# Patient Record
Sex: MALE | Race: BLACK OR AFRICAN AMERICAN | NOT HISPANIC OR LATINO | Employment: UNEMPLOYED | URBAN - NONMETROPOLITAN AREA
[De-identification: names, ages, dates, MRNs, and addresses within clinical notes are randomized per-mention and may not be internally consistent; named-entity substitution may affect disease eponyms.]

---

## 2021-08-31 ENCOUNTER — HOSPITAL ENCOUNTER (EMERGENCY)
Facility: HOSPITAL | Age: 51
Discharge: HOME | End: 2021-08-31
Attending: STUDENT IN AN ORGANIZED HEALTH CARE EDUCATION/TRAINING PROGRAM
Payer: MEDICAID

## 2021-08-31 VITALS
TEMPERATURE: 102 F | RESPIRATION RATE: 18 BRPM | BODY MASS INDEX: 33.32 KG/M2 | OXYGEN SATURATION: 97 % | DIASTOLIC BLOOD PRESSURE: 91 MMHG | SYSTOLIC BLOOD PRESSURE: 163 MMHG | HEART RATE: 75 BPM | HEIGHT: 65 IN | WEIGHT: 200 LBS

## 2021-08-31 DIAGNOSIS — U07.1 COVID-19: Primary | ICD-10-CM

## 2021-08-31 PROCEDURE — 99283 EMERGENCY DEPT VISIT LOW MDM: CPT

## 2021-08-31 PROCEDURE — 25000003 PHARM REV CODE 250: Performed by: STUDENT IN AN ORGANIZED HEALTH CARE EDUCATION/TRAINING PROGRAM

## 2021-08-31 RX ORDER — AZITHROMYCIN 250 MG/1
500 TABLET, FILM COATED ORAL DAILY
Qty: 5 TABLET | Refills: 0 | Status: SHIPPED | OUTPATIENT
Start: 2021-08-31 | End: 2021-09-05

## 2021-08-31 RX ORDER — ACETAMINOPHEN 325 MG/1
650 TABLET ORAL
Status: COMPLETED | OUTPATIENT
Start: 2021-08-31 | End: 2021-08-31

## 2021-08-31 RX ADMIN — ACETAMINOPHEN 650 MG: 325 TABLET ORAL at 07:08

## 2021-09-04 ENCOUNTER — NURSE TRIAGE (OUTPATIENT)
Dept: ADMINISTRATIVE | Facility: CLINIC | Age: 51
End: 2021-09-04

## 2021-11-22 ENCOUNTER — HOSPITAL ENCOUNTER (EMERGENCY)
Facility: HOSPITAL | Age: 51
Discharge: HOME | End: 2021-11-22
Attending: EMERGENCY MEDICINE
Payer: MEDICAID

## 2021-11-22 VITALS
HEIGHT: 64 IN | RESPIRATION RATE: 16 BRPM | DIASTOLIC BLOOD PRESSURE: 83 MMHG | TEMPERATURE: 99 F | HEART RATE: 72 BPM | BODY MASS INDEX: 29.37 KG/M2 | SYSTOLIC BLOOD PRESSURE: 172 MMHG | WEIGHT: 172 LBS | OXYGEN SATURATION: 99 %

## 2021-11-22 DIAGNOSIS — H92.01 OTALGIA OF RIGHT EAR: Primary | ICD-10-CM

## 2021-11-22 PROCEDURE — 25000003 PHARM REV CODE 250: Performed by: EMERGENCY MEDICINE

## 2021-11-22 PROCEDURE — 99284 EMERGENCY DEPT VISIT MOD MDM: CPT | Mod: 25

## 2021-11-22 PROCEDURE — 63600175 PHARM REV CODE 636 W HCPCS: Performed by: EMERGENCY MEDICINE

## 2021-11-22 PROCEDURE — 96372 THER/PROPH/DIAG INJ SC/IM: CPT

## 2021-11-22 RX ORDER — AMOXICILLIN 875 MG/1
875 TABLET, FILM COATED ORAL 2 TIMES DAILY
Qty: 14 TABLET | Refills: 0 | Status: ON HOLD | OUTPATIENT
Start: 2021-11-22 | End: 2023-07-30 | Stop reason: HOSPADM

## 2021-11-22 RX ORDER — CEFTRIAXONE 1 G/1
1 INJECTION, POWDER, FOR SOLUTION INTRAMUSCULAR; INTRAVENOUS ONCE
Status: COMPLETED | OUTPATIENT
Start: 2021-11-22 | End: 2021-11-22

## 2021-11-22 RX ORDER — DEXAMETHASONE SODIUM PHOSPHATE 4 MG/ML
4 INJECTION, SOLUTION INTRA-ARTICULAR; INTRALESIONAL; INTRAMUSCULAR; INTRAVENOUS; SOFT TISSUE
Status: COMPLETED | OUTPATIENT
Start: 2021-11-22 | End: 2021-11-22

## 2021-11-22 RX ORDER — CIPROFLOXACIN 0.5 MG/.25ML
4 SOLUTION/ DROPS AURICULAR (OTIC) 2 TIMES DAILY
Qty: 20 EACH | Refills: 0 | Status: SHIPPED | OUTPATIENT
Start: 2021-11-22 | End: 2021-12-02

## 2021-11-22 RX ORDER — LIDOCAINE HYDROCHLORIDE 10 MG/ML
1 INJECTION INFILTRATION; PERINEURAL ONCE
Status: COMPLETED | OUTPATIENT
Start: 2021-11-22 | End: 2021-11-22

## 2021-11-22 RX ADMIN — LIDOCAINE HYDROCHLORIDE 1 ML: 10 INJECTION, SOLUTION INFILTRATION; PERINEURAL at 06:11

## 2021-11-22 RX ADMIN — CEFTRIAXONE SODIUM 1 G: 1 INJECTION, POWDER, FOR SOLUTION INTRAMUSCULAR; INTRAVENOUS at 06:11

## 2021-11-22 RX ADMIN — DEXAMETHASONE SODIUM PHOSPHATE 4 MG: 4 INJECTION, SOLUTION INTRA-ARTICULAR; INTRALESIONAL; INTRAMUSCULAR; INTRAVENOUS; SOFT TISSUE at 06:11

## 2021-12-06 ENCOUNTER — HOSPITAL ENCOUNTER (EMERGENCY)
Facility: HOSPITAL | Age: 51
Discharge: HOME | End: 2021-12-06
Attending: EMERGENCY MEDICINE
Payer: MEDICAID

## 2021-12-06 VITALS
WEIGHT: 177 LBS | OXYGEN SATURATION: 99 % | TEMPERATURE: 98 F | HEIGHT: 65 IN | BODY MASS INDEX: 29.49 KG/M2 | RESPIRATION RATE: 16 BRPM | SYSTOLIC BLOOD PRESSURE: 140 MMHG | DIASTOLIC BLOOD PRESSURE: 80 MMHG | HEART RATE: 68 BPM

## 2021-12-06 DIAGNOSIS — H60.91 OTITIS EXTERNA OF RIGHT EAR, UNSPECIFIED CHRONICITY, UNSPECIFIED TYPE: Primary | ICD-10-CM

## 2021-12-06 PROCEDURE — 96372 THER/PROPH/DIAG INJ SC/IM: CPT

## 2021-12-06 PROCEDURE — 63600175 PHARM REV CODE 636 W HCPCS: Performed by: NURSE PRACTITIONER

## 2021-12-06 PROCEDURE — 99284 EMERGENCY DEPT VISIT MOD MDM: CPT | Mod: 25

## 2021-12-06 PROCEDURE — 25000003 PHARM REV CODE 250: Performed by: NURSE PRACTITIONER

## 2021-12-06 RX ORDER — NEOMYCIN SULFATE, POLYMYXIN B SULFATE AND HYDROCORTISONE 10; 3.5; 1 MG/ML; MG/ML; [USP'U]/ML
4 SUSPENSION/ DROPS AURICULAR (OTIC) 3 TIMES DAILY
Qty: 8 ML | Refills: 0 | Status: SHIPPED | OUTPATIENT
Start: 2021-12-06 | End: 2021-12-16

## 2021-12-06 RX ORDER — LIDOCAINE HYDROCHLORIDE 10 MG/ML
1 INJECTION INFILTRATION; PERINEURAL
Status: COMPLETED | OUTPATIENT
Start: 2021-12-06 | End: 2021-12-06

## 2021-12-06 RX ORDER — CEFTRIAXONE 1 G/1
1 INJECTION, POWDER, FOR SOLUTION INTRAMUSCULAR; INTRAVENOUS
Status: COMPLETED | OUTPATIENT
Start: 2021-12-06 | End: 2021-12-06

## 2021-12-06 RX ADMIN — LIDOCAINE HYDROCHLORIDE 1 ML: 10 INJECTION, SOLUTION INFILTRATION; PERINEURAL at 10:12

## 2021-12-06 RX ADMIN — CEFTRIAXONE SODIUM 1 G: 1 INJECTION, POWDER, FOR SOLUTION INTRAMUSCULAR; INTRAVENOUS at 10:12

## 2021-12-10 ENCOUNTER — PATIENT OUTREACH (OUTPATIENT)
Dept: EMERGENCY MEDICINE | Facility: HOSPITAL | Age: 51
End: 2021-12-10
Payer: MEDICAID

## 2021-12-12 ENCOUNTER — HOSPITAL ENCOUNTER (EMERGENCY)
Facility: HOSPITAL | Age: 51
Discharge: HOME | End: 2021-12-12
Attending: STUDENT IN AN ORGANIZED HEALTH CARE EDUCATION/TRAINING PROGRAM
Payer: MEDICAID

## 2021-12-12 VITALS
HEART RATE: 60 BPM | BODY MASS INDEX: 28.82 KG/M2 | OXYGEN SATURATION: 100 % | TEMPERATURE: 99 F | HEIGHT: 65 IN | RESPIRATION RATE: 16 BRPM | SYSTOLIC BLOOD PRESSURE: 225 MMHG | DIASTOLIC BLOOD PRESSURE: 99 MMHG | WEIGHT: 173 LBS

## 2021-12-12 DIAGNOSIS — H66.91 RIGHT OTITIS MEDIA, UNSPECIFIED OTITIS MEDIA TYPE: Primary | ICD-10-CM

## 2021-12-12 DIAGNOSIS — I10 HYPERTENSION, UNSPECIFIED TYPE: ICD-10-CM

## 2021-12-12 PROCEDURE — 96372 THER/PROPH/DIAG INJ SC/IM: CPT

## 2021-12-12 PROCEDURE — 63600175 PHARM REV CODE 636 W HCPCS: Performed by: STUDENT IN AN ORGANIZED HEALTH CARE EDUCATION/TRAINING PROGRAM

## 2021-12-12 PROCEDURE — 99284 EMERGENCY DEPT VISIT MOD MDM: CPT | Mod: 25

## 2021-12-12 PROCEDURE — 25000003 PHARM REV CODE 250: Performed by: STUDENT IN AN ORGANIZED HEALTH CARE EDUCATION/TRAINING PROGRAM

## 2021-12-12 RX ORDER — KETOROLAC TROMETHAMINE 10 MG/1
10 TABLET, FILM COATED ORAL EVERY 6 HOURS
Qty: 20 TABLET | Refills: 0 | Status: SHIPPED | OUTPATIENT
Start: 2021-12-12 | End: 2021-12-17

## 2021-12-12 RX ORDER — AMOXICILLIN AND CLAVULANATE POTASSIUM 875; 125 MG/1; MG/1
1 TABLET, FILM COATED ORAL EVERY 12 HOURS
Qty: 20 TABLET | Refills: 0 | Status: SHIPPED | OUTPATIENT
Start: 2021-12-12 | End: 2021-12-12 | Stop reason: SDUPTHER

## 2021-12-12 RX ORDER — KETOROLAC TROMETHAMINE 30 MG/ML
30 INJECTION, SOLUTION INTRAMUSCULAR; INTRAVENOUS
Status: COMPLETED | OUTPATIENT
Start: 2021-12-12 | End: 2021-12-12

## 2021-12-12 RX ORDER — AMOXICILLIN AND CLAVULANATE POTASSIUM 875; 125 MG/1; MG/1
1 TABLET, FILM COATED ORAL
Status: COMPLETED | OUTPATIENT
Start: 2021-12-12 | End: 2021-12-12

## 2021-12-12 RX ORDER — AMOXICILLIN AND CLAVULANATE POTASSIUM 875; 125 MG/1; MG/1
1 TABLET, FILM COATED ORAL EVERY 12 HOURS
Qty: 20 TABLET | Refills: 0 | Status: SHIPPED | OUTPATIENT
Start: 2021-12-12 | End: 2021-12-22

## 2021-12-12 RX ORDER — KETOROLAC TROMETHAMINE 10 MG/1
10 TABLET, FILM COATED ORAL EVERY 6 HOURS
Qty: 20 TABLET | Refills: 0 | Status: SHIPPED | OUTPATIENT
Start: 2021-12-12 | End: 2021-12-12 | Stop reason: SDUPTHER

## 2021-12-12 RX ADMIN — KETOROLAC TROMETHAMINE 30 MG: 30 INJECTION, SOLUTION INTRAMUSCULAR; INTRAVENOUS at 06:12

## 2021-12-12 RX ADMIN — AMOXICILLIN AND CLAVULANATE POTASSIUM 1 TABLET: 875; 125 TABLET, FILM COATED ORAL at 06:12

## 2021-12-27 ENCOUNTER — HOSPITAL ENCOUNTER (EMERGENCY)
Facility: HOSPITAL | Age: 51
Discharge: HOME | End: 2021-12-27
Attending: STUDENT IN AN ORGANIZED HEALTH CARE EDUCATION/TRAINING PROGRAM
Payer: MEDICAID

## 2021-12-27 VITALS
RESPIRATION RATE: 19 BRPM | OXYGEN SATURATION: 99 % | DIASTOLIC BLOOD PRESSURE: 101 MMHG | SYSTOLIC BLOOD PRESSURE: 222 MMHG | TEMPERATURE: 99 F | HEART RATE: 62 BPM

## 2021-12-27 DIAGNOSIS — G89.29 CHRONIC EAR PAIN, UNSPECIFIED LATERALITY: Primary | ICD-10-CM

## 2021-12-27 DIAGNOSIS — I10 UNCONTROLLED HYPERTENSION: ICD-10-CM

## 2021-12-27 DIAGNOSIS — H92.09 CHRONIC EAR PAIN, UNSPECIFIED LATERALITY: Primary | ICD-10-CM

## 2021-12-27 PROCEDURE — 99284 EMERGENCY DEPT VISIT MOD MDM: CPT | Mod: 25

## 2021-12-27 PROCEDURE — 96372 THER/PROPH/DIAG INJ SC/IM: CPT

## 2021-12-27 PROCEDURE — 63600175 PHARM REV CODE 636 W HCPCS: Performed by: STUDENT IN AN ORGANIZED HEALTH CARE EDUCATION/TRAINING PROGRAM

## 2021-12-27 RX ORDER — KETOROLAC TROMETHAMINE 10 MG/1
10 TABLET, FILM COATED ORAL EVERY 6 HOURS
Qty: 20 TABLET | Refills: 0 | Status: SHIPPED | OUTPATIENT
Start: 2021-12-27 | End: 2022-01-01

## 2021-12-27 RX ORDER — KETOROLAC TROMETHAMINE 30 MG/ML
30 INJECTION, SOLUTION INTRAMUSCULAR; INTRAVENOUS
Status: COMPLETED | OUTPATIENT
Start: 2021-12-27 | End: 2021-12-27

## 2021-12-27 RX ADMIN — KETOROLAC TROMETHAMINE 30 MG: 30 INJECTION, SOLUTION INTRAMUSCULAR at 06:12

## 2021-12-27 NOTE — ED PROVIDER NOTES
Encounter Date: 12/27/2021       History   No chief complaint on file.    51-year-old male with history of uncontrolled hypertension and chronic right ear pain for the past few weeks presents with worsening right ear pain after running out of his ketorolac.  Patient said that he has follow-up with ENT this Thursday.  Patient says he is also taking his antibiotics.  Patient denies any other new symptoms.  Patient mentioned that he does not take blood pressure medication because he believes it should be on the higher side.  Patient denies any associated symptoms with blood pressure.        Review of patient's allergies indicates:  No Known Allergies  No past medical history on file.  No past surgical history on file.  No family history on file.  Social History     Tobacco Use    Smoking status: Never Smoker    Smokeless tobacco: Never Used     Review of Systems   Constitutional: Negative.    HENT: Positive for ear pain and facial swelling.    Respiratory: Negative.    Cardiovascular: Negative.    Gastrointestinal: Negative.    Genitourinary: Negative.    Musculoskeletal: Negative.    Skin: Negative.    Neurological: Negative.    Psychiatric/Behavioral: Negative.    All other systems reviewed and are negative.      Physical Exam     Initial Vitals [12/27/21 0611]   BP Pulse Resp Temp SpO2   (!) 219/102 62 19 98.7 °F (37.1 °C) 99 %      MAP       --         Physical Exam    Nursing note and vitals reviewed.  Constitutional: Vital signs are normal. He appears well-developed and well-nourished.   HENT:   Head: Normocephalic and atraumatic.   Mouth/Throat: No oropharyngeal exudate.   Serous fluid of right ear similar to prior no mastoid tenderness   Eyes: Conjunctivae and lids are normal.   Neck: Trachea normal. Neck supple.   Cardiovascular: Normal rate, regular rhythm and normal pulses.   Pulmonary/Chest: Breath sounds normal. He has no wheezes. He has no rhonchi.   Abdominal: Abdomen is soft. Bowel sounds are normal.    Musculoskeletal:         General: Normal range of motion.      Cervical back: Neck supple.     Neurological: He is alert and oriented to person, place, and time.   Skin: Skin is warm. Capillary refill takes less than 2 seconds.   Psychiatric: He has a normal mood and affect. His speech is normal.         ED Course   Procedures  Labs Reviewed - No data to display       Imaging Results    None          Medications   ketorolac injection 30 mg (30 mg Intramuscular Given 12/27/21 0629)     Medical Decision Making:   Initial Assessment:   51-year-old male with history of uncontrolled hypertension and chronic right ear pain for the past few weeks presents with worsening right ear pain after running out of his ketorolac. Patient hypertensive but otherwise stable vitals. Physical exam noted. Will give a dose of pain meds. Advised patient follow up with ent and pcp for chronic ear pain and also better bp control. Gave information on diet and exercise for bp control in the meantime                      Clinical Impression:   Final diagnoses:  [H92.09, G89.29] Chronic ear pain, unspecified laterality (Primary)  [I10] Uncontrolled hypertension          ED Disposition Condition    Discharge Stable        ED Prescriptions     Medication Sig Dispense Start Date End Date Auth. Provider    ketorolac (TORADOL) 10 mg tablet Take 1 tablet (10 mg total) by mouth every 6 (six) hours. for 5 days 20 tablet 12/27/2021 1/1/2022 Omi De La Torre MD        Follow-up Information     Follow up With Specialties Details Why Contact Info Additional Information    [facility] Emergency Department Emergency Medicine  As needed, If symptoms worsen [address] [facility] [id number] [phone] Floor 1    [facility] Psychology, Internal Medicine, Gynecology, Dental General Practice In 2 days  [address] [place] [address]  [phone]              Omi De La Torre MD  12/27/21 5621       Omi De La Torre  MD  12/27/21 0630       Omi De La Torre MD  12/27/21 0631

## 2021-12-27 NOTE — Clinical Note
"Rikki"Jodi Graham was seen and treated in our emergency department on 12/27/2021.  He may return to work on 12/29/2021.       If you have any questions or concerns, please don't hesitate to call.      Omi De La Torre MD"

## 2021-12-27 NOTE — ED TRIAGE NOTES
Pt states that he was here for right ear pain 3 weeks ago. He has an appointment on this Thursday but ran out of meds and wants pain meds.

## 2022-02-14 ENCOUNTER — HOSPITAL ENCOUNTER (EMERGENCY)
Facility: HOSPITAL | Age: 52
Discharge: HOME | End: 2022-02-14
Attending: EMERGENCY MEDICINE
Payer: MEDICAID

## 2022-02-14 VITALS
HEIGHT: 65 IN | SYSTOLIC BLOOD PRESSURE: 229 MMHG | WEIGHT: 176 LBS | BODY MASS INDEX: 29.32 KG/M2 | HEART RATE: 74 BPM | RESPIRATION RATE: 18 BRPM | OXYGEN SATURATION: 99 % | DIASTOLIC BLOOD PRESSURE: 98 MMHG | TEMPERATURE: 98 F

## 2022-02-14 DIAGNOSIS — R03.0 ELEVATED BLOOD PRESSURE READING: ICD-10-CM
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
## 2022-02-14 PROCEDURE — 63600175 PHARM REV CODE 636 W HCPCS: Performed by: EMERGENCY MEDICINE

## 2022-02-14 PROCEDURE — 96372 THER/PROPH/DIAG INJ SC/IM: CPT

## 2022-02-14 PROCEDURE — 99284 EMERGENCY DEPT VISIT MOD MDM: CPT | Mod: 25

## 2022-02-14 PROCEDURE — 25000003 PHARM REV CODE 250: Performed by: EMERGENCY MEDICINE

## 2022-02-14 RX ORDER — KETOROLAC TROMETHAMINE 30 MG/ML
60 INJECTION, SOLUTION INTRAMUSCULAR; INTRAVENOUS
Status: COMPLETED | OUTPATIENT
Start: 2022-02-14 | End: 2022-02-14

## 2022-02-14 RX ORDER — METHOCARBAMOL 500 MG/1
1000 TABLET, FILM COATED ORAL 3 TIMES DAILY
Qty: 30 TABLET | Refills: 0 | Status: SHIPPED | OUTPATIENT
Start: 2022-02-14 | End: 2022-02-19

## 2022-02-14 RX ORDER — CLONIDINE HYDROCHLORIDE 0.2 MG/1
0.2 TABLET ORAL
Status: DISCONTINUED | OUTPATIENT
Start: 2022-02-14 | End: 2022-02-14 | Stop reason: HOSPADM

## 2022-02-14 RX ADMIN — KETOROLAC TROMETHAMINE 60 MG: 30 INJECTION, SOLUTION INTRAMUSCULAR at 06:02
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
## 2022-05-23 ENCOUNTER — HOSPITAL ENCOUNTER (EMERGENCY)
Facility: HOSPITAL | Age: 52
Discharge: HOME | End: 2022-05-23
Attending: EMERGENCY MEDICINE
Payer: MEDICAID

## 2022-05-23 VITALS
TEMPERATURE: 98 F | BODY MASS INDEX: 28.79 KG/M2 | SYSTOLIC BLOOD PRESSURE: 217 MMHG | HEART RATE: 68 BPM | WEIGHT: 173 LBS | OXYGEN SATURATION: 98 % | DIASTOLIC BLOOD PRESSURE: 106 MMHG | RESPIRATION RATE: 18 BRPM

## 2022-05-23 DIAGNOSIS — R03.0 ELEVATED BLOOD PRESSURE READING IN OFFICE WITHOUT DIAGNOSIS OF HYPERTENSION: ICD-10-CM

## 2022-05-23 DIAGNOSIS — H10.31 ACUTE CONJUNCTIVITIS OF RIGHT EYE, UNSPECIFIED ACUTE CONJUNCTIVITIS TYPE: Primary | ICD-10-CM

## 2022-05-23 PROCEDURE — 25000003 PHARM REV CODE 250: Performed by: CLINICAL NURSE SPECIALIST

## 2022-05-23 PROCEDURE — 99283 EMERGENCY DEPT VISIT LOW MDM: CPT

## 2022-05-23 RX ORDER — TETRACAINE HYDROCHLORIDE 5 MG/ML
2 SOLUTION OPHTHALMIC ONCE
Status: COMPLETED | OUTPATIENT
Start: 2022-05-23 | End: 2022-05-23

## 2022-05-23 RX ORDER — ERYTHROMYCIN 5 MG/G
OINTMENT OPHTHALMIC
Qty: 3.5 G | Refills: 0 | Status: ON HOLD | OUTPATIENT
Start: 2022-05-23 | End: 2023-07-30 | Stop reason: HOSPADM

## 2022-05-23 RX ADMIN — TETRACAINE HYDROCHLORIDE 2 DROP: 5 SOLUTION OPHTHALMIC at 10:05

## 2022-05-23 NOTE — ED PROVIDER NOTES
Encounter Date: 5/23/2022       History     Chief Complaint   Patient presents with    Eye Problem     Woke up this morning with itching and burning in right eye. Watering. Onset this morning. Denies injury.      Rikki Graham is an 52 y.o. male who complains of right eye pain, itching, burning since this morning.  Denies any foreign body, injury.  /106, no history of hypertension.        Review of patient's allergies indicates:  No Known Allergies  History reviewed. No pertinent past medical history.  No past surgical history on file.  No family history on file.  Social History     Tobacco Use    Smoking status: Never Smoker    Smokeless tobacco: Never Used     Review of Systems   Constitutional: Negative for fever.   HENT: Negative for sore throat.    Respiratory: Negative for shortness of breath.    Cardiovascular: Negative for chest pain.   Gastrointestinal: Negative for nausea.   Genitourinary: Negative for dysuria.   Musculoskeletal: Negative for back pain.   Skin: Negative for rash.   Neurological: Negative for weakness.   Hematological: Does not bruise/bleed easily.   All other systems reviewed and are negative.      Physical Exam     Initial Vitals   BP Pulse Resp Temp SpO2   05/23/22 1016 05/23/22 1014 05/23/22 1014 05/23/22 1015 05/23/22 1014   (!) 217/106 68 18 98.1 °F (36.7 °C) 98 %      MAP       --                Physical Exam    Nursing note and vitals reviewed.  Constitutional: He appears well-developed and well-nourished.   HENT:   Head: Normocephalic and atraumatic.   Eyes: Pupils are equal, round, and reactive to light. Right eye exhibits discharge. Right conjunctiva is injected.   Cardiovascular: Normal rate and regular rhythm.   Pulmonary/Chest: Breath sounds normal.   Abdominal: Abdomen is soft. Bowel sounds are normal.   Musculoskeletal:         General: Normal range of motion.     Neurological: He is alert and oriented to person, place, and time.   Psychiatric: He has a normal  "mood and affect.         ED Course   Procedures  Labs Reviewed - No data to display       Imaging Results    None          Medications   TETRAcaine HCl (PF) 0.5 % Drop 2 drop (has no administration in time range)     Medical Decision Making:   Differential Diagnosis:   Conjunctivitis, foreign body to right eye, corneal abrasion  ED Management:  /106, patient refused to take blood pressure medication as ordered.  Denies any symptoms.  Patient states "has studied [employer] and black people are supposed to have elevated blood pressures.  He states he takes some vinegar at home in the blood pressure usually goes down.  Patient also reports that he does not have a blood pressure machine at home.  Instructed on this risk of strokes, dialysis with high blood pressures.  Patient will sign AMA form                      Clinical Impression:   Final diagnoses:  [H10.31] Acute conjunctivitis of right eye, unspecified acute conjunctivitis type (Primary)  [R03.0] Elevated blood pressure reading in office without diagnosis of hypertension          ED Disposition Condition    [place]               Ade Dykes NP  05/23/22 1027    "

## 2022-05-23 NOTE — LETTER
Patient: Rikki Graham  YOB: 1970  Date: 5/23/2022 Time: 10:27 AM  Location: [facility] Emergency Department    Leaving the [facility] Against Medical Advice    Chart #:[id number]    This will certify that I, the undersigned,    ______________________________________________________________________    A patient in the above named [facility] center, having requested discharge and removal from the medical [place] against the advice of my attending physician(s), hereby release [facility], its physicians, officers and employees, severally and individually, from any and all liability of any nature whatsoever for any injury or harm or complication of any kind that may result directly or indirectly, by reason of my terminating my stay as a patient at [facility] Department and my departure from [facility], and hereby waive any and all rights of action I may now have or later acquire as a result of my voluntary departure from [facility] and the termination of my stay as a patient therein.    This release is made with the full knowledge of the danger that may result from the action which I am taking.      Date:_______________________                         ___________________________                                                                                    Patient/Legal Representative    Witness:        ____________________________                          ___________________________  Nurse                                                                        Physician

## 2022-11-21 ENCOUNTER — HOSPITAL ENCOUNTER (EMERGENCY)
Facility: HOSPITAL | Age: 52
Discharge: HOME | End: 2022-11-21
Attending: EMERGENCY MEDICINE
Payer: MEDICAID

## 2022-11-21 VITALS
HEART RATE: 64 BPM | RESPIRATION RATE: 18 BRPM | WEIGHT: 177 LBS | OXYGEN SATURATION: 100 % | TEMPERATURE: 98 F | SYSTOLIC BLOOD PRESSURE: 214 MMHG | DIASTOLIC BLOOD PRESSURE: 105 MMHG | BODY MASS INDEX: 29.45 KG/M2

## 2022-11-21 DIAGNOSIS — I10 HYPERTENSION, UNSPECIFIED TYPE: ICD-10-CM

## 2022-11-21 DIAGNOSIS — G57.93 NEUROPATHY OF BOTH FEET: Primary | ICD-10-CM

## 2022-11-21 PROCEDURE — 96372 THER/PROPH/DIAG INJ SC/IM: CPT | Performed by: EMERGENCY MEDICINE

## 2022-11-21 PROCEDURE — 63600175 PHARM REV CODE 636 W HCPCS: Performed by: EMERGENCY MEDICINE

## 2022-11-21 PROCEDURE — 99284 EMERGENCY DEPT VISIT MOD MDM: CPT | Mod: 25

## 2022-11-21 RX ORDER — KETOROLAC TROMETHAMINE 30 MG/ML
15 INJECTION, SOLUTION INTRAMUSCULAR; INTRAVENOUS
Status: COMPLETED | OUTPATIENT
Start: 2022-11-21 | End: 2022-11-21

## 2022-11-21 RX ADMIN — KETOROLAC TROMETHAMINE 15 MG: 30 INJECTION, SOLUTION INTRAMUSCULAR at 07:11

## 2022-11-21 NOTE — ED PROVIDER NOTES
EMERGENCY DEPARTMENT HISTORY AND PHYSICAL EXAM     This note is dictated on M*Modal word recognition program.  There are word recognition mistakes and grammatical errors that are occasionally missed on review.     Date: 11/21/2022   Patient Name: Rikki Graham       History of Presenting Illness           Chief Complaint   Patient presents with    Foot Problem     Pt presents to the ER w/ complaints of bilateral foot pain for an unspecified amount of time. Pt states he suspects he has a fungal infection on both feet, has been attempting to treat with medicated ointment but reports worsening symptoms. Pt states he is concerned w/ possible infection.          History Provided By: Patient    0700   Rikki Graham is a 52 y.o. male with PMHX of hypertension who presents to the emergency department C/O bilateral foot pain.    Patient reports bilateral foot pain that feels like a numbness and burning in his feet.  States this has been going on for several weeks but he has been putting it off.  He states he thinks he might have a foot infection.  He has been using topical antifungals without relief.  No trauma.      PCP: Primary Doctor No        No current facility-administered medications for this encounter.     Current Outpatient Medications   Medication Sig Dispense Refill    amoxicillin (AMOXIL) 875 MG tablet Take 1 tablet (875 mg total) by mouth 2 (two) times daily. 14 tablet 0    erythromycin (ROMYCIN) ophthalmic ointment Place a 1/2 inch ribbon of ointment into the lower eyelid three times a day 3.5 g 0           Past History     Past Medical History:   History reviewed. No pertinent past medical history.     Past Surgical History:   No past surgical history on file.     Family History:   No family history on file.     Social History:   Social History     Tobacco Use    Smoking status: Never    Smokeless tobacco: Never        Allergies:   Review of patient's allergies indicates:  No Known  Allergies       Review of Systems   Review of Systems   Constitutional:  Negative for fever.   Musculoskeletal:  Positive for arthralgias and myalgias.   Skin:  Negative for rash and wound.   All other systems reviewed and are negative.             Physical Exam     Vitals:    11/21/22 0644 11/21/22 0647   BP:  (!) 214/105   Pulse:  64   Resp:  18   Temp:  97.9 °F (36.6 °C)   SpO2:  100%   Weight: 80.3 kg (177 lb)       Physical Exam  Vitals and nursing note reviewed.   Constitutional:       General: He is not in acute distress.     Appearance: Normal appearance. He is well-developed. He is not ill-appearing.   HENT:      Head: Normocephalic and atraumatic.   Eyes:      Extraocular Movements: Extraocular movements intact.      Conjunctiva/sclera: Conjunctivae normal.   Pulmonary:      Effort: Pulmonary effort is normal. No respiratory distress.   Musculoskeletal:         General: No deformity or signs of injury. Normal range of motion.      Cervical back: Normal range of motion. No rigidity.      Right foot: Normal range of motion.      Left foot: Normal range of motion.   Feet:      Right foot:      Skin integrity: Callus and dry skin present. No ulcer, blister, skin breakdown, erythema, warmth or fissure.      Toenail Condition: Right toenails are normal.      Left foot:      Skin integrity: Callus and dry skin present. No ulcer, blister, skin breakdown, erythema, warmth or fissure.      Toenail Condition: Left toenails are normal.   Skin:     General: Skin is dry.      Coloration: Skin is not pale.      Findings: No rash.   Neurological:      General: No focal deficit present.      Mental Status: He is alert and oriented to person, place, and time.      Cranial Nerves: No cranial nerve deficit.      Motor: No weakness.      Coordination: Coordination normal.   Psychiatric:         Mood and Affect: Mood normal.         Behavior: Behavior normal.            Diagnostic Study Results      Labs -   No results found  for this or any previous visit (from the past 12 hour(s)).     Radiologic Studies -    No orders to display        Medications given in the ED-   Medications   ketorolac injection 15 mg (15 mg Intramuscular Given 11/21/22 0707)           Medical Decision Making    I am the first provider for this patient.     I reviewed the vital signs, available nursing notes, past medical history, past surgical history, family history and social history.     Vital Signs:  Reviewed the patient's vital signs.     Pulse Oximetry Analysis and Interpretation:    100% on Room Air, normal      Records Reviewed: Nursing notes.        Provider Notes (Medical Decision Making): Rikki Graham is a 52 y.o. male with bilateral peripheral neuropathy no indications of bacterial or fungal infection.      Discuss this diagnosis with the patient.  Patient declining testing for diabetes at this time.  He denies history of diabetes.  Additionally discussed patient's elevated blood pressure.  He is declining treatment for his blood pressure at this time.  Review prior visits for patient has had significantly elevated blood pressure turn all prior ED visits.  H    Strongly encouraged patient to establish care primary care which he seemed receptive to.  Will provide referral to primary care office for patient.  Discussed he needs diabetes, blood pressure management, and if neuropathy persists there are long-term therapeutic options for improvement of his symptoms.  Patient seemed receptive to this.      Procedures:   Procedures      ED Course:               Diagnosis and Disposition     Critical Care:      DISCHARGE NOTE:       Rikki Graham's  results have been reviewed with him.  He has been counseled regarding his diagnosis, treatment, and plan.  He verbally conveys understanding and agreement of the signs, symptoms, diagnosis, treatment and prognosis and additionally agrees to follow up as discussed.  He also agrees with the care-plan  and conveys that all of his questions have been answered.  I have also provided discharge instructions for him that include: educational information regarding their diagnosis and treatment, and list of reasons why they would want to return to the ED prior to their follow-up appointment, should his condition change. He has been provided with education for proper emergency department utilization.         CLINICAL IMPRESSION:         1. Neuropathy of both feet    2. Hypertension, unspecified type              PLAN:   1. Discharge Home  2.      Medication List        ASK your doctor about these medications      amoxicillin 875 MG tablet  Commonly known as: AMOXIL  Take 1 tablet (875 mg total) by mouth 2 (two) times daily.     erythromycin ophthalmic ointment  Commonly known as: ROMYCIN  Place a 1/2 inch ribbon of ointment into the lower eyelid three times a day             3. Davidson Fonseca DO  [address] [address]  [phone]    Call today  Primary care follow up       _______________________________     Please note that this dictation was completed with [employer], the computer voice recognition software.  Quite often unanticipated grammatical, syntax, homophones, and other interpretive errors are inadvertently transcribed by the computer software.  Please disregard these errors.  Please excuse any errors that have escaped final proofreading.             Thad Franco MD  11/21/22 3396

## 2023-03-31 ENCOUNTER — HOSPITAL ENCOUNTER (EMERGENCY)
Facility: HOSPITAL | Age: 53
Discharge: HOME | End: 2023-03-31
Attending: EMERGENCY MEDICINE
Payer: MEDICAID

## 2023-03-31 VITALS
BODY MASS INDEX: 30.09 KG/M2 | HEART RATE: 69 BPM | TEMPERATURE: 98 F | OXYGEN SATURATION: 100 % | SYSTOLIC BLOOD PRESSURE: 201 MMHG | HEIGHT: 65 IN | RESPIRATION RATE: 16 BRPM | DIASTOLIC BLOOD PRESSURE: 94 MMHG | WEIGHT: 180.63 LBS

## 2023-03-31 DIAGNOSIS — I96 NECROSIS OF TOE: ICD-10-CM

## 2023-03-31 DIAGNOSIS — M79.675 TOE PAIN, LEFT: Primary | ICD-10-CM

## 2023-03-31 DIAGNOSIS — R52 PAIN: ICD-10-CM

## 2023-03-31 PROCEDURE — 25000003 PHARM REV CODE 250: Performed by: EMERGENCY MEDICINE

## 2023-03-31 PROCEDURE — 99284 EMERGENCY DEPT VISIT MOD MDM: CPT

## 2023-03-31 RX ORDER — CLONIDINE HYDROCHLORIDE 0.1 MG/1
0.1 TABLET ORAL
Status: COMPLETED | OUTPATIENT
Start: 2023-03-31 | End: 2023-03-31

## 2023-03-31 RX ORDER — KETOROLAC TROMETHAMINE 10 MG/1
10 TABLET, FILM COATED ORAL EVERY 8 HOURS PRN
Qty: 15 TABLET | Refills: 0 | Status: SHIPPED | OUTPATIENT
Start: 2023-03-31 | End: 2023-04-05

## 2023-03-31 RX ORDER — AMLODIPINE BESYLATE 10 MG/1
10 TABLET ORAL
Status: COMPLETED | OUTPATIENT
Start: 2023-03-31 | End: 2023-03-31

## 2023-03-31 RX ORDER — CEPHALEXIN 500 MG/1
500 CAPSULE ORAL 4 TIMES DAILY
Qty: 40 CAPSULE | Refills: 0 | Status: SHIPPED | OUTPATIENT
Start: 2023-03-31 | End: 2023-04-10

## 2023-03-31 RX ADMIN — CLONIDINE HYDROCHLORIDE 0.1 MG: 0.1 TABLET ORAL at 06:03

## 2023-03-31 RX ADMIN — AMLODIPINE BESYLATE 10 MG: 10 TABLET ORAL at 06:03

## 2023-03-31 NOTE — ED PROVIDER NOTES
"Encounter Date: 3/31/2023       History     Chief Complaint   Patient presents with    Toe Injury     Pt presents to the ER w/ complaints of L second to pain. Pt states he "dropped a frozen pork chop" on his toe 4 months ago, has seen his pcp but complains of increased pain. Pt also states the "toe is turning black."      53-year-old male presents the emergency room with left 2nd toe pain.  States he dropped a roast on it for months ago, was seen by his PCP, told to come here to get some antibiotics.  Has an appointment with a general surgeon  in 5 days.  Denies fever.  Complaining of pain with ambulation.  Better with rest and elevation.  No other issues.  No fever.  Patient states the tip of his toe has been black for quite some time now.    Review of patient's allergies indicates:  No Known Allergies  Past Medical History:   Diagnosis Date    Diabetes mellitus     Hypertension     Neuropathy      No past surgical history on file.  No family history on file.  Social History     Tobacco Use    Smoking status: Never    Smokeless tobacco: Never     Review of Systems   Constitutional:  Negative for fever.   HENT:  Negative for sore throat.    Respiratory:  Negative for shortness of breath.    Cardiovascular:  Negative for chest pain.   Gastrointestinal:  Negative for nausea.   Genitourinary:  Negative for dysuria.   Musculoskeletal:  Negative for back pain.   Skin:  Negative for rash.   Neurological:  Negative for weakness.   Hematological:  Does not bruise/bleed easily.   All other systems reviewed and are negative.    Physical Exam     Initial Vitals [03/31/23 0612]   BP Pulse Resp Temp SpO2   (!) 223/111 70 16 98.3 °F (36.8 °C) 100 %      MAP       --         Physical Exam    Nursing note and vitals reviewed.  Constitutional: He appears well-developed and well-nourished. He is not diaphoretic. No distress.   HENT:   Head: Normocephalic and atraumatic.   Eyes: Conjunctivae and EOM are normal. Pupils are " equal, round, and reactive to light. Right eye exhibits no discharge. Left eye exhibits no discharge. No scleral icterus.   Neck: Neck supple. No JVD present.   Normal range of motion.  Cardiovascular:  Normal rate, regular rhythm, normal heart sounds and intact distal pulses.           No murmur heard.  Pulmonary/Chest: Breath sounds normal. No stridor. No respiratory distress. He has no wheezes. He has no rhonchi. He has no rales. He exhibits no tenderness.   Abdominal: Abdomen is soft. Bowel sounds are normal. He exhibits no distension and no mass. There is no abdominal tenderness. There is no rebound and no guarding.   Musculoskeletal:         General: Tenderness present. No edema. Normal range of motion.      Cervical back: Normal range of motion and neck supple.      Comments: Left 2nd toe with distal necrosis, foot is warm, neurovascularly intact, steady gait     Neurological: He is alert and oriented to person, place, and time. He has normal strength. GCS score is 15. GCS eye subscore is 4. GCS verbal subscore is 5. GCS motor subscore is 6.   Skin: Skin is warm and dry. Capillary refill takes less than 2 seconds.       ED Course   Procedures  Labs Reviewed - No data to display       Imaging Results              X-Ray Foot Complete Left (In process)                      Medications   cloNIDine tablet 0.1 mg (0.1 mg Oral Given 3/31/23 0629)   amLODIPine tablet 10 mg (10 mg Oral Given 3/31/23 0629)     Medical Decision Making:   Differential Diagnosis:   Left 2nd toe distal necrosis, dry gangrene           ED Course as of 03/31/23 0640   Fri Mar 31, 2023   0635 X-ray with no bony involvement. [SD]      ED Course User Index  [SD] Avery Leonard MD                 Clinical Impression:   Final diagnoses:  [R52] Pain  [M79.675] Toe pain, left (Primary)  [I96] Necrosis of toe        ED Disposition Condition    Discharge Stable          ED Prescriptions       Medication Sig Dispense Start Date End Date Auth.  Provider    cephALEXin (KEFLEX) 500 MG capsule Take 1 capsule (500 mg total) by mouth 4 (four) times daily. for 10 days 40 capsule 3/31/2023 4/10/2023 Avery Leonard MD    ketorolac (TORADOL) 10 mg tablet Take 1 tablet (10 mg total) by mouth every 8 (eight) hours as needed for Pain. 15 tablet 3/31/2023 4/5/2023 Avery Leonard MD          Follow-up Information       Follow up With Specialties Details Why Contact Info Additional Information    Belinda Welch MD General Surgery On 4/5/2023  [facility] [address]  [phone]       [facility] Emergency Department Emergency Medicine  As needed, If symptoms worsen [address] [place] [address] [id number] [phone] Floor [address]             Avery Leonard MD  03/31/23 0607

## 2023-04-05 ENCOUNTER — OFFICE VISIT (OUTPATIENT)
Dept: WOUND CARE | Facility: HOSPITAL | Age: 53
End: 2023-04-05
Attending: SURGERY
Payer: MEDICAID

## 2023-04-05 VITALS
SYSTOLIC BLOOD PRESSURE: 179 MMHG | HEART RATE: 71 BPM | TEMPERATURE: 98 F | RESPIRATION RATE: 17 BRPM | DIASTOLIC BLOOD PRESSURE: 99 MMHG

## 2023-04-05 DIAGNOSIS — E11.621 TYPE 2 DIABETES MELLITUS WITH FOOT ULCER, UNSPECIFIED WHETHER LONG TERM INSULIN USE: ICD-10-CM

## 2023-04-05 DIAGNOSIS — L97.509 TYPE 2 DIABETES MELLITUS WITH FOOT ULCER, UNSPECIFIED WHETHER LONG TERM INSULIN USE: ICD-10-CM

## 2023-04-05 DIAGNOSIS — L97.522 ULCER OF LEFT FOOT, WITH FAT LAYER EXPOSED: ICD-10-CM

## 2023-04-05 DIAGNOSIS — R09.89 DECREASED PULSES IN FEET: Primary | ICD-10-CM

## 2023-04-05 PROCEDURE — 99214 OFFICE O/P EST MOD 30 MIN: CPT

## 2023-04-05 RX ORDER — GABAPENTIN 100 MG/1
100 CAPSULE ORAL 3 TIMES DAILY
Qty: 90 CAPSULE | Refills: 11 | Status: SHIPPED | OUTPATIENT
Start: 2023-04-05 | End: 2024-04-04

## 2023-05-19 PROBLEM — R09.89 DECREASED PULSES IN FEET: Status: ACTIVE | Noted: 2023-05-19

## 2023-05-19 PROBLEM — L97.509 TYPE 2 DIABETES MELLITUS WITH FOOT ULCER: Status: ACTIVE | Noted: 2023-05-19

## 2023-05-19 PROBLEM — E11.621 TYPE 2 DIABETES MELLITUS WITH FOOT ULCER: Status: ACTIVE | Noted: 2023-05-19

## 2023-05-19 PROBLEM — L97.522 ULCER OF LEFT FOOT, WITH FAT LAYER EXPOSED: Status: ACTIVE | Noted: 2023-05-19

## 2023-05-20 NOTE — PROGRESS NOTES
[facility]  Wound Care  History and Physical    Problem List Items Addressed This Visit          Cardiac/Vascular    Decreased pulses in feet - Primary    Overview     Pt with nonpalpable pulses BLE.  DEVYN on L 0.56 and R noncompressible.  No vascular workup in past.  Arterial US ordered.           Relevant Orders    CBC Auto Differential    Comprehensive Metabolic Panel    Sedimentation rate    C-REACTIVE PROTEIN    US Lower Extremity Arteries Bilateral    HEMOGLOBIN A1C       Endocrine    Type 2 diabetes mellitus with foot ulcer    Overview     Pt with longstanding diabetes.  Unsure of control.  Does not check blood sugars.  Last HgbA1c was 10.6.  Needs repeat labs.  Ordered today              Orthopedic    Ulcer of left foot, with fat layer exposed    Overview     Ischemic changes to L second toe.  He reports this has been present for 4 months since he dropped a frozen pork chop on his foot.  No previous wound care.  Eschar measures 1x1.5x0.1 at distal 2nd toe.  No erythema or drainage.  Painted with betadine.  Dry dressing applied.  Complains of pain and that is what prompted him to get care.  Neurontin written for pain.                  History:    Past Medical History:   Diagnosis Date    Diabetes mellitus     Hypertension     Neuropathy        History reviewed. No pertinent surgical history.    History reviewed. No pertinent family history.     reports that he has never smoked. He has never used smokeless tobacco. No history on file for alcohol use and drug use.    has a current medication list which includes the following prescription(s): amoxicillin, erythromycin, and gabapentin.    Allergies:  Patient has no known allergies.    Review of Systems:  Review of Systems   Constitutional:  Negative for chills, fever and malaise/fatigue.   HENT:  Negative for congestion, hearing loss and sore throat.    Respiratory:  Negative for cough.    Cardiovascular:  Positive for claudication. Negative  for chest pain, palpitations and leg swelling.   Gastrointestinal:  Negative for abdominal pain, nausea and vomiting.   Genitourinary:  Negative for dysuria and frequency.   Musculoskeletal:  Positive for joint pain and myalgias.   Skin:  Negative for rash.       Vitals:    04/05/23 1023   BP: (!) 179/99   Pulse: 71   Resp: 17   Temp: 97.8 °F (36.6 °C)         BMI:  There is no height or weight on file to calculate BMI.    Physical Exam:  Physical Exam  Vitals and nursing note reviewed.   Constitutional:       General: He is not in acute distress.     Appearance: Normal appearance.   HENT:      Head: Normocephalic and atraumatic.      Nose: Nose normal.      Mouth/Throat:      Mouth: Mucous membranes are moist.      Pharynx: Oropharynx is clear.   Eyes:      General: No scleral icterus.     Extraocular Movements: Extraocular movements intact.      Conjunctiva/sclera: Conjunctivae normal.      Pupils: Pupils are equal, round, and reactive to light.   Pulmonary:      Effort: No respiratory distress.      Breath sounds: Normal breath sounds. No wheezing or rales.   Abdominal:      General: There is no distension.      Palpations: Abdomen is soft.      Tenderness: There is no abdominal tenderness. There is no guarding or rebound.   Musculoskeletal:         General: Deformity present. No swelling. Normal range of motion.   Skin:     Comments: See wound docs   Neurological:      Mental Status: He is alert.      Sensory: Sensory deficit (decreased sensation BLE) present.      Motor: No weakness.   Psychiatric:         Mood and Affect: Mood normal.         Behavior: Behavior normal.       A1C:  No results for input(s): HGBA1C in the last 2160 hours.  BMP:  No results for input(s): GLU, NA, K, CL, CO2, BUN, CREATININE, CALCIUM, MG in the last 2160 hours.   CBC:  No results for input(s): WBC, RBC, HGB, HCT, PLT, MCV, MCH, MCHC in the last 2160 hours.  CMP:  No results for input(s): GLU, CALCIUM, ALBUMIN, PROT, NA, K, CO2, CL,  BUN, ALKPHOS, ALT, AST, BILITOT in the last 2160 hours.    Invalid input(s): CREATININ  PREALBUMIN:  No results for input(s): PREALBUMIN in the last 2160 hours.  WOUND CULTURES:  No results for input(s): LABAERO in the last 2160 hours.        Plan:  See Wound Docs note for plan and follow up.        Belinda Welch  [facility]

## 2023-07-29 ENCOUNTER — HOSPITAL ENCOUNTER (INPATIENT)
Facility: HOSPITAL | Age: 53
LOS: 1 days | DRG: 638 | End: 2023-07-30
Attending: EMERGENCY MEDICINE | Admitting: HOSPITALIST
Payer: MEDICAID

## 2023-07-29 DIAGNOSIS — I73.9 PERIPHERAL ARTERY DISEASE: ICD-10-CM

## 2023-07-29 DIAGNOSIS — M86.172 ACUTE OSTEOMYELITIS OF TOE OF LEFT FOOT: ICD-10-CM

## 2023-07-29 DIAGNOSIS — I16.0 ASYMPTOMATIC HYPERTENSIVE URGENCY: ICD-10-CM

## 2023-07-29 DIAGNOSIS — R07.9 CHEST PAIN: ICD-10-CM

## 2023-07-29 DIAGNOSIS — M86.10 ACUTE OSTEOMYELITIS: Primary | ICD-10-CM

## 2023-07-29 DIAGNOSIS — M79.672 LEFT FOOT PAIN: ICD-10-CM

## 2023-07-29 PROBLEM — I10 BENIGN ESSENTIAL HYPERTENSION: Status: ACTIVE | Noted: 2023-07-29

## 2023-07-29 PROBLEM — E11.628 TYPE 2 DIABETES MELLITUS WITH SKIN COMPLICATION, WITHOUT LONG-TERM CURRENT USE OF INSULIN: Status: ACTIVE | Noted: 2023-05-19

## 2023-07-29 PROBLEM — I96 DRY GANGRENE: Status: ACTIVE | Noted: 2023-07-29

## 2023-07-29 PROBLEM — E78.5 HYPERLIPIDEMIA: Status: ACTIVE | Noted: 2023-07-29

## 2023-07-29 LAB
ALBUMIN SERPL BCP-MCNC: 3.3 G/DL (ref 3.5–5.2)
ALP SERPL-CCNC: 59 U/L (ref 55–135)
ALT SERPL W/O P-5'-P-CCNC: 13 U/L (ref 10–44)
ANION GAP SERPL CALC-SCNC: 10 MMOL/L (ref 8–16)
AST SERPL-CCNC: 16 U/L (ref 10–40)
BASOPHILS # BLD AUTO: 0.05 K/UL (ref 0–0.2)
BASOPHILS NFR BLD: 0.8 % (ref 0–1.9)
BILIRUB SERPL-MCNC: 0.3 MG/DL (ref 0.1–1)
BUN SERPL-MCNC: 23 MG/DL (ref 6–20)
CALCIUM SERPL-MCNC: 9.8 MG/DL (ref 8.7–10.5)
CHLORIDE SERPL-SCNC: 106 MMOL/L (ref 95–110)
CO2 SERPL-SCNC: 22 MMOL/L (ref 23–29)
CREAT SERPL-MCNC: 1.3 MG/DL (ref 0.5–1.4)
CRP SERPL-MCNC: 3.4 MG/L (ref 0–8.2)
DIFFERENTIAL METHOD: ABNORMAL
EOSINOPHIL # BLD AUTO: 0.4 K/UL (ref 0–0.5)
EOSINOPHIL NFR BLD: 6.1 % (ref 0–8)
ERYTHROCYTE [DISTWIDTH] IN BLOOD BY AUTOMATED COUNT: 13.6 % (ref 11.5–14.5)
ERYTHROCYTE [SEDIMENTATION RATE] IN BLOOD BY PHOTOMETRIC METHOD: 66 MM/HR (ref 0–23)
EST. GFR  (NO RACE VARIABLE): >60 ML/MIN/1.73 M^2
GLUCOSE SERPL-MCNC: 212 MG/DL (ref 70–110)
HCT VFR BLD AUTO: 34.5 % (ref 40–54)
HGB BLD-MCNC: 11.6 G/DL (ref 14–18)
IMM GRANULOCYTES # BLD AUTO: 0.02 K/UL (ref 0–0.04)
IMM GRANULOCYTES NFR BLD AUTO: 0.3 % (ref 0–0.5)
LYMPHOCYTES # BLD AUTO: 2.1 K/UL (ref 1–4.8)
LYMPHOCYTES NFR BLD: 33 % (ref 18–48)
MCH RBC QN AUTO: 30.1 PG (ref 27–31)
MCHC RBC AUTO-ENTMCNC: 33.6 G/DL (ref 32–36)
MCV RBC AUTO: 90 FL (ref 82–98)
MONOCYTES # BLD AUTO: 0.4 K/UL (ref 0.3–1)
MONOCYTES NFR BLD: 6 % (ref 4–15)
NEUTROPHILS # BLD AUTO: 3.4 K/UL (ref 1.8–7.7)
NEUTROPHILS NFR BLD: 53.8 % (ref 38–73)
NRBC BLD-RTO: 0 /100 WBC
PLATELET # BLD AUTO: 288 K/UL (ref 150–450)
PMV BLD AUTO: 10.6 FL (ref 9.2–12.9)
POCT GLUCOSE: 176 MG/DL (ref 70–110)
POTASSIUM SERPL-SCNC: 4.4 MMOL/L (ref 3.5–5.1)
PROT SERPL-MCNC: 6.9 G/DL (ref 6–8.4)
RBC # BLD AUTO: 3.85 M/UL (ref 4.6–6.2)
SODIUM SERPL-SCNC: 138 MMOL/L (ref 136–145)
WBC # BLD AUTO: 6.37 K/UL (ref 3.9–12.7)

## 2023-07-29 PROCEDURE — 80053 COMPREHEN METABOLIC PANEL: CPT

## 2023-07-29 PROCEDURE — G0378 HOSPITAL OBSERVATION PER HR: HCPCS

## 2023-07-29 PROCEDURE — 25000003 PHARM REV CODE 250: Performed by: PHYSICIAN ASSISTANT

## 2023-07-29 PROCEDURE — 85652 RBC SED RATE AUTOMATED: CPT

## 2023-07-29 PROCEDURE — 82962 GLUCOSE BLOOD TEST: CPT

## 2023-07-29 PROCEDURE — 96372 THER/PROPH/DIAG INJ SC/IM: CPT | Performed by: PHYSICIAN ASSISTANT

## 2023-07-29 PROCEDURE — 63600175 PHARM REV CODE 636 W HCPCS

## 2023-07-29 PROCEDURE — 96375 TX/PRO/DX INJ NEW DRUG ADDON: CPT

## 2023-07-29 PROCEDURE — 25500020 PHARM REV CODE 255: Performed by: EMERGENCY MEDICINE

## 2023-07-29 PROCEDURE — 86140 C-REACTIVE PROTEIN: CPT

## 2023-07-29 PROCEDURE — 96376 TX/PRO/DX INJ SAME DRUG ADON: CPT

## 2023-07-29 PROCEDURE — 96365 THER/PROPH/DIAG IV INF INIT: CPT

## 2023-07-29 PROCEDURE — 99285 EMERGENCY DEPT VISIT HI MDM: CPT | Mod: 25

## 2023-07-29 PROCEDURE — 96367 TX/PROPH/DG ADDL SEQ IV INF: CPT

## 2023-07-29 PROCEDURE — 96366 THER/PROPH/DIAG IV INF ADDON: CPT

## 2023-07-29 PROCEDURE — 87040 BLOOD CULTURE FOR BACTERIA: CPT | Performed by: PHYSICIAN ASSISTANT

## 2023-07-29 PROCEDURE — 99223 1ST HOSP IP/OBS HIGH 75: CPT | Mod: ,,, | Performed by: PHYSICIAN ASSISTANT

## 2023-07-29 PROCEDURE — 63600175 PHARM REV CODE 636 W HCPCS: Performed by: PHYSICIAN ASSISTANT

## 2023-07-29 PROCEDURE — 99223 PR INITIAL HOSPITAL CARE,LEVL III: ICD-10-PCS | Mod: ,,, | Performed by: PHYSICIAN ASSISTANT

## 2023-07-29 PROCEDURE — 85025 COMPLETE CBC W/AUTO DIFF WBC: CPT

## 2023-07-29 PROCEDURE — 25000003 PHARM REV CODE 250

## 2023-07-29 PROCEDURE — 11000001 HC ACUTE MED/SURG PRIVATE ROOM

## 2023-07-29 RX ORDER — IBUPROFEN 200 MG
16 TABLET ORAL
Status: DISCONTINUED | OUTPATIENT
Start: 2023-07-29 | End: 2023-07-30 | Stop reason: HOSPADM

## 2023-07-29 RX ORDER — PROMETHAZINE HYDROCHLORIDE 25 MG/1
25 TABLET ORAL EVERY 6 HOURS PRN
Status: DISCONTINUED | OUTPATIENT
Start: 2023-07-29 | End: 2023-07-30 | Stop reason: HOSPADM

## 2023-07-29 RX ORDER — TALC
6 POWDER (GRAM) TOPICAL NIGHTLY PRN
Status: DISCONTINUED | OUTPATIENT
Start: 2023-07-29 | End: 2023-07-30 | Stop reason: HOSPADM

## 2023-07-29 RX ORDER — ONDANSETRON 8 MG/1
8 TABLET, ORALLY DISINTEGRATING ORAL EVERY 8 HOURS PRN
Status: DISCONTINUED | OUTPATIENT
Start: 2023-07-29 | End: 2023-07-30 | Stop reason: HOSPADM

## 2023-07-29 RX ORDER — SULFAMETHOXAZOLE AND TRIMETHOPRIM 800; 160 MG/1; MG/1
1 TABLET ORAL 2 TIMES DAILY
Status: ON HOLD | COMMUNITY
Start: 2023-07-17 | End: 2023-07-30 | Stop reason: HOSPADM

## 2023-07-29 RX ORDER — ACETAMINOPHEN 325 MG/1
650 TABLET ORAL EVERY 4 HOURS PRN
Status: DISCONTINUED | OUTPATIENT
Start: 2023-07-29 | End: 2023-07-30 | Stop reason: HOSPADM

## 2023-07-29 RX ORDER — HYDRALAZINE HYDROCHLORIDE 20 MG/ML
10 INJECTION INTRAMUSCULAR; INTRAVENOUS
Status: COMPLETED | OUTPATIENT
Start: 2023-07-29 | End: 2023-07-29

## 2023-07-29 RX ORDER — KETOROLAC TROMETHAMINE 30 MG/ML
10 INJECTION, SOLUTION INTRAMUSCULAR; INTRAVENOUS
Status: COMPLETED | OUTPATIENT
Start: 2023-07-29 | End: 2023-07-29

## 2023-07-29 RX ORDER — ONDANSETRON 2 MG/ML
4 INJECTION INTRAMUSCULAR; INTRAVENOUS
Status: COMPLETED | OUTPATIENT
Start: 2023-07-29 | End: 2023-07-29

## 2023-07-29 RX ORDER — IBUPROFEN 200 MG
24 TABLET ORAL
Status: DISCONTINUED | OUTPATIENT
Start: 2023-07-29 | End: 2023-07-30 | Stop reason: HOSPADM

## 2023-07-29 RX ORDER — MORPHINE SULFATE 4 MG/ML
4 INJECTION, SOLUTION INTRAMUSCULAR; INTRAVENOUS
Status: COMPLETED | OUTPATIENT
Start: 2023-07-29 | End: 2023-07-29

## 2023-07-29 RX ORDER — GLIPIZIDE 5 MG/1
5 TABLET ORAL EVERY MORNING
COMMUNITY
Start: 2023-03-27

## 2023-07-29 RX ORDER — SODIUM CHLORIDE, SODIUM LACTATE, POTASSIUM CHLORIDE, CALCIUM CHLORIDE 600; 310; 30; 20 MG/100ML; MG/100ML; MG/100ML; MG/100ML
INJECTION, SOLUTION INTRAVENOUS CONTINUOUS
Status: DISCONTINUED | OUTPATIENT
Start: 2023-07-29 | End: 2023-07-30

## 2023-07-29 RX ORDER — OXYCODONE HYDROCHLORIDE 10 MG/1
10 TABLET ORAL EVERY 6 HOURS PRN
Status: DISCONTINUED | OUTPATIENT
Start: 2023-07-29 | End: 2023-07-30 | Stop reason: HOSPADM

## 2023-07-29 RX ORDER — LEVOFLOXACIN 750 MG/1
750 TABLET ORAL 3 TIMES DAILY
Status: ON HOLD | COMMUNITY
Start: 2023-07-16 | End: 2023-07-30 | Stop reason: SDUPTHER

## 2023-07-29 RX ORDER — GLUCAGON 1 MG
1 KIT INJECTION
Status: DISCONTINUED | OUTPATIENT
Start: 2023-07-29 | End: 2023-07-30 | Stop reason: HOSPADM

## 2023-07-29 RX ORDER — ASPIRIN 81 MG/1
81 TABLET ORAL DAILY
Status: DISCONTINUED | OUTPATIENT
Start: 2023-07-29 | End: 2023-07-30 | Stop reason: HOSPADM

## 2023-07-29 RX ORDER — BISACODYL 10 MG
10 SUPPOSITORY, RECTAL RECTAL DAILY PRN
Status: DISCONTINUED | OUTPATIENT
Start: 2023-07-29 | End: 2023-07-30 | Stop reason: HOSPADM

## 2023-07-29 RX ORDER — AMLODIPINE BESYLATE 10 MG/1
10 TABLET ORAL DAILY
Status: DISCONTINUED | OUTPATIENT
Start: 2023-07-30 | End: 2023-07-30 | Stop reason: HOSPADM

## 2023-07-29 RX ORDER — ATORVASTATIN CALCIUM 40 MG/1
80 TABLET, FILM COATED ORAL DAILY
Status: DISCONTINUED | OUTPATIENT
Start: 2023-07-29 | End: 2023-07-30 | Stop reason: HOSPADM

## 2023-07-29 RX ORDER — POLYETHYLENE GLYCOL 3350 17 G/17G
17 POWDER, FOR SOLUTION ORAL DAILY PRN
Status: DISCONTINUED | OUTPATIENT
Start: 2023-07-29 | End: 2023-07-30 | Stop reason: HOSPADM

## 2023-07-29 RX ORDER — AMLODIPINE BESYLATE 10 MG/1
10 TABLET ORAL
Status: COMPLETED | OUTPATIENT
Start: 2023-07-29 | End: 2023-07-29

## 2023-07-29 RX ORDER — CLONIDINE HYDROCHLORIDE 0.2 MG/1
0.2 TABLET ORAL EVERY 12 HOURS
COMMUNITY
Start: 2023-07-16

## 2023-07-29 RX ORDER — ENOXAPARIN SODIUM 100 MG/ML
40 INJECTION SUBCUTANEOUS EVERY 24 HOURS
Status: DISCONTINUED | OUTPATIENT
Start: 2023-07-29 | End: 2023-07-30 | Stop reason: HOSPADM

## 2023-07-29 RX ORDER — OXYCODONE HYDROCHLORIDE 5 MG/1
5 TABLET ORAL EVERY 6 HOURS PRN
Status: DISCONTINUED | OUTPATIENT
Start: 2023-07-29 | End: 2023-07-30 | Stop reason: HOSPADM

## 2023-07-29 RX ORDER — INSULIN ASPART 100 [IU]/ML
0-5 INJECTION, SOLUTION INTRAVENOUS; SUBCUTANEOUS
Status: DISCONTINUED | OUTPATIENT
Start: 2023-07-29 | End: 2023-07-30 | Stop reason: HOSPADM

## 2023-07-29 RX ORDER — INSULIN ASPART 100 [IU]/ML
3 INJECTION, SOLUTION INTRAVENOUS; SUBCUTANEOUS
Status: DISCONTINUED | OUTPATIENT
Start: 2023-07-30 | End: 2023-07-30 | Stop reason: HOSPADM

## 2023-07-29 RX ADMIN — ASPIRIN 81 MG: 81 TABLET, COATED ORAL at 10:07

## 2023-07-29 RX ADMIN — INSULIN DETEMIR 10 UNITS: 100 INJECTION, SOLUTION SUBCUTANEOUS at 10:07

## 2023-07-29 RX ADMIN — VANCOMYCIN HYDROCHLORIDE 1000 MG: 1 INJECTION, POWDER, LYOPHILIZED, FOR SOLUTION INTRAVENOUS at 02:07

## 2023-07-29 RX ADMIN — ENOXAPARIN SODIUM 40 MG: 40 INJECTION SUBCUTANEOUS at 09:07

## 2023-07-29 RX ADMIN — PIPERACILLIN SODIUM AND TAZOBACTAM SODIUM 4.5 G: 4; .5 INJECTION, POWDER, FOR SOLUTION INTRAVENOUS at 01:07

## 2023-07-29 RX ADMIN — IOHEXOL 100 ML: 350 INJECTION, SOLUTION INTRAVENOUS at 03:07

## 2023-07-29 RX ADMIN — HYDRALAZINE HYDROCHLORIDE 10 MG: 20 INJECTION, SOLUTION INTRAMUSCULAR; INTRAVENOUS at 05:07

## 2023-07-29 RX ADMIN — SODIUM CHLORIDE, POTASSIUM CHLORIDE, SODIUM LACTATE AND CALCIUM CHLORIDE: 600; 310; 30; 20 INJECTION, SOLUTION INTRAVENOUS at 10:07

## 2023-07-29 RX ADMIN — KETOROLAC TROMETHAMINE 10 MG: 30 INJECTION, SOLUTION INTRAMUSCULAR; INTRAVENOUS at 01:07

## 2023-07-29 RX ADMIN — HYDRALAZINE HYDROCHLORIDE 10 MG: 20 INJECTION, SOLUTION INTRAMUSCULAR; INTRAVENOUS at 06:07

## 2023-07-29 RX ADMIN — ONDANSETRON 4 MG: 2 INJECTION INTRAMUSCULAR; INTRAVENOUS at 03:07

## 2023-07-29 RX ADMIN — MORPHINE SULFATE 4 MG: 4 INJECTION INTRAVENOUS at 03:07

## 2023-07-29 RX ADMIN — ATORVASTATIN CALCIUM 80 MG: 40 TABLET, FILM COATED ORAL at 09:07

## 2023-07-29 RX ADMIN — MORPHINE SULFATE 4 MG: 4 INJECTION INTRAVENOUS at 06:07

## 2023-07-29 RX ADMIN — AMLODIPINE BESYLATE 10 MG: 10 TABLET ORAL at 03:07

## 2023-07-29 NOTE — Clinical Note
Diagnosis: Foot pain, left [[id number]]   Future Attending Provider: CINDY HOBBS [[id number]]   Admitting Provider:: CINDY HOBBS [[id number]]

## 2023-07-29 NOTE — ED PROVIDER NOTES
Encounter Date: 7/29/2023       History     Chief Complaint   Patient presents with    Foot Problem     States left foot gangrene/ wants debridement    Wound Check     53-year-old male with history of type 2 diabetes and hypertension presents to ED regarding left foot pain.  Patient reports about 8-9 months ago he dropped a frozen pork roast on his left foot resulting in gangrene to his 2nd and 5th digits around January/February.  Patient states he sought medical care in April and was given antibiotics that improved but then the gangrene returned last month.  States he has been soaking it and using Vicks vapor rub with some relief.  He did go to [facility] ED earlier this month but left AMA.  He was given ciprofloxacin and Bactrim which patient states he finished.  Reports his left foot started becoming painful within last 24 hours accompanied with some swelling.  Denies fever, body aches or chills, chest pain, shortness of breath, or any other symptoms at this time.    The history is provided by the patient and medical records.     Review of patient's allergies indicates:  No Known Allergies  Past Medical History:   Diagnosis Date    Diabetes mellitus     Hypertension     Neuropathy      History reviewed. No pertinent surgical history.  No family history on file.  Social History     Tobacco Use    Smoking status: Never    Smokeless tobacco: Never     Review of Systems   Constitutional:  Negative for fever.   HENT:  Negative for sore throat.    Respiratory:  Negative for shortness of breath.    Cardiovascular:  Negative for chest pain.   Gastrointestinal:  Negative for nausea.   Genitourinary:  Negative for dysuria.   Musculoskeletal:  Negative for back pain.        Left foot pain   Skin:  Negative for rash.   Neurological:  Negative for weakness.   Hematological:  Does not bruise/bleed easily.       Physical Exam     Initial Vitals [07/29/23 1154]   BP Pulse Resp Temp SpO2   (!) 190/76 77 16 97.9 °F (36.6 °C) 98 %       MAP       --         Physical Exam    Vitals reviewed.  Constitutional: He appears well-developed and well-nourished. He is not diaphoretic. No distress.   HENT:   Head: Normocephalic and atraumatic.   Cardiovascular:  Normal rate, regular rhythm and normal heart sounds.     Exam reveals no gallop and no friction rub.       No murmur heard.  Pulmonary/Chest: Breath sounds normal. He has no wheezes. He has no rhonchi. He has no rales.   Musculoskeletal:      Left ankle: No tenderness. Normal range of motion.      Left foot: Decreased capillary refill. Normal range of motion. Swelling and tenderness present. No bony tenderness. Abnormal pulse.      Comments: 2nd and 5th digits gangrenous. No purulence or drainage. See picture below. Distal left foot mildly erythematous, edematous, and warm. Faintly palpable DP. Doppler signals present.  Full ROM and intact sensations     Neurological: He is alert and oriented to person, place, and time. He has normal strength. No sensory deficit.   Psychiatric: He has a normal mood and affect.                 ED Course   Critical Care    Date/Time: 7/29/2023 4:00 PM    Performed by: Adilene Charles MD  Authorized by: Adilene Charles MD  Direct patient critical care time: 15 minutes  Additional history critical care time: 10 minutes  Ordering / reviewing critical care time: 15 minutes  Documentation critical care time: 10 minutes  Consulting other physicians critical care time: 5 minutes  Total critical care time (exclusive of procedural time) : 55 minutes  Critical care time was exclusive of separately billable procedures and treating other patients and teaching time.  Critical care was necessary to treat or prevent imminent or life-threatening deterioration of the following conditions: circulatory failure.  Critical care was time spent personally by me on the following activities: development of treatment plan with patient or surrogate, discussions with consultants,  interpretation of cardiac output measurements, evaluation of patient's response to treatment, examination of patient, obtaining history from patient or surrogate, ordering and performing treatments and interventions, ordering and review of radiographic studies, ordering and review of laboratory studies, pulse oximetry, re-evaluation of patient's condition and review of old charts.        Labs Reviewed   CBC W/ AUTO DIFFERENTIAL - Abnormal; Notable for the following components:       Result Value    RBC 3.85 (*)     Hemoglobin 11.6 (*)     Hematocrit 34.5 (*)     All other components within normal limits   COMPREHENSIVE METABOLIC PANEL - Abnormal; Notable for the following components:    CO2 22 (*)     Glucose 212 (*)     BUN 23 (*)     Albumin 3.3 (*)     All other components within normal limits   SEDIMENTATION RATE - Abnormal; Notable for the following components:    Sed Rate 66 (*)     All other components within normal limits   POCT GLUCOSE - Abnormal; Notable for the following components:    POCT Glucose 176 (*)     All other components within normal limits   C-REACTIVE PROTEIN          Imaging Results              X-Ray Foot Complete Left (Final result)  Result time 07/29/23 18:54:20      Final result by Can Hutchison DO (07/29/23 18:54:20)                   Impression:      Osseous erosive changes or acute osteomyelitis of the 2nd toe distal phalanx, progressed from prior.      Electronically signed by: Can Hutchison  Date:    07/29/2023  Time:    18:54               Narrative:    EXAMINATION:  XR FOOT COMPLETE 3 VIEW LEFT    CLINICAL HISTORY:  .  Pain in left foot    TECHNIQUE:  AP, lateral and oblique views of the left foot were performed.    COMPARISON:  03/31/2023.    FINDINGS:  There was a changes of the 2nd toe distal phalanx, progressed from prior.  Remaining osseous structures are unremarkable.  Alignment is normal.  Joint spaces are preserved.  There are vascular calcifications.  Alignment is  normal. The Lisfranc articulation is congruent. Joint spaces are preserved.                                       CTA Runoff ABD PEL Bilat Lower Ext (Final result)  Result time 07/29/23 19:18:51      Final result by Danny Rodriguez MD (07/29/23 19:18:51)                   Impression:      Extensive predominantly calcified atherosclerotic plaque throughout the bilateral lower extremities contributing to multifocal areas of moderate to high-grade stenosis.  Bilateral two-vessel runoff within the distal lower extremities.  Findings are detailed above.    Extensive calcified atherosclerotic plaque throughout the mid to distal ORLY contributing to multifocal areas of at least moderate to high-grade stenosis.    Other findings above.    Electronically signed by resident: Teodoro Guallpa  Date:    07/29/2023  Time:    16:54    Electronically signed by: Danny Rodriguez MD  Date:    07/29/2023  Time:    19:18               Narrative:    EXAMINATION:  CTA RUNOFF ABD PEL BILAT LOWER EXT    CLINICAL HISTORY:  Claudication or leg ischemia;    TECHNIQUE:  Using 100 cc of  Omnipaque 350 IV contrast, and multi-detector helical CT technique, axial CT angiogram images of the abdomen/pelvis with lower extremity runoff.  2D post-processing coronal and sagittal reconstructions of the abdominal aorta, visceral arteries, and lower extremities performed.    COMPARISON:  None    FINDINGS:  VASCULATURE:    There is no evidence of aortic dissection, aneurysm, or stenosis.  There is minimal atherosclerotic change of the abdominal aorta.  There is mild atherosclerosis of the bilateral common and external iliac arteries without aneurysm.  There is extensive atherosclerotic change involving the bilateral internal iliac and femoral arteries.  There are multifocal areas of moderate to high-grade stenosis within the distal bilateral internal iliac arteries.    The celiac artery and SMA demonstrate no significant stenosis.  The ORLY is patent at its origin  with extensive calcific atherosclerotic change within its distal branches contributing to multifocal areas of at least moderate to high-grade stenosis.    The right and left renal arteries are patent without significant stenosis.    Right lower extremity:    - Common femoral: Patent with extensive predominantly calcified atherosclerotic plaque but no apparent flow-limiting stenosis.    - SFA: Patent with extensive predominantly calcified atherosclerotic plaque contributing to skip areas of moderate to high-grade stenosis.    - Profunda: Patent with extensive predominantly calcified atherosclerotic plaque contributing to skip areas of moderate to high-grade stenosis.    - Popliteal: Patent with extensive predominantly calcified atherosclerotic plaque contributing to high-grade stenosis.    - Runoff: Anterior and posterior tibial arteries are patent.  There is occlusion of the fibular artery midway through its course.    Left lower extremity:    - Common femoral: Patent with extensive predominantly calcified atherosclerotic plaque but no apparent flow-limiting stenosis.    - SFA: Patent with extensive predominantly calcified atherosclerotic plaque contributing to skip areas of moderate to high-grade stenosis.    - Profunda: Patent with extensive predominantly calcified atherosclerotic plaque contributing to skip areas of moderate to high-grade stenosis.    - Popliteal: Patent with extensive predominantly calcified atherosclerotic plaque contributing to high-grade stenosis.    - Runoff: Tibioperoneal trunk is patent.  There is occlusion of the anterior tibial artery proximally.    ABDOMEN/PELVIS:    LUNG BASES: Extensive multivessel coronary artery calcifications.  Lung bases are well aerated.  No pleural effusion    HEPATOBILIARY: Liver is normal in size.  No focal hepatic lesions. No biliary ductal dilatation. Normal gallbladder.    SPLEEN: No splenomegaly.    PANCREAS: No focal masses or ductal  dilatation.    ADRENALS: No adrenal nodules.    KIDNEYS/URETERS: Kidneys are normal in size and enhancement.  No hydronephrosis, stones, or solid mass lesions.    BLADDER/PELVIC ORGANS: No bladder wall thickening.  Vas deferens calcifications noted.  Prostate is within normal limits.    PERITONEUM / RETROPERITONEUM: No free air or fluid.    LYMPH NODES: No lymphadenopathy.    GI TRACT: No distention or wall thickening.  Normal appendix.    SOFT TISSUES: Tiny fat containing umbilical hernia.    BONES: No fractures or focal osseous lesions.                                       Medications   vancomycin (VANCOCIN) 1,000 mg in dextrose 5 % (D5W) 250 mL IVPB (Vial-Mate) (0 mg Intravenous Stopped 7/29/23 1706)   piperacillin-tazobactam (ZOSYN) 4.5 g in dextrose 5 % in water (D5W) 100 mL IVPB (MB+) (0 g Intravenous Stopped 7/29/23 1405)   ketorolac injection 9.999 mg (9.999 mg Intravenous Given 7/29/23 1311)   amLODIPine tablet 10 mg (10 mg Oral Given 7/29/23 1514)   morphine injection 4 mg (4 mg Intravenous Given by Other 7/29/23 1512)   ondansetron injection 4 mg (4 mg Intravenous Given by Other 7/29/23 1512)   iohexoL (OMNIPAQUE 350) injection 100 mL (100 mLs Intravenous Given 7/29/23 1508)   hydrALAZINE injection 10 mg (10 mg Intravenous Given by Other 7/29/23 1706)   hydrALAZINE injection 10 mg (10 mg Intravenous Given 7/29/23 1851)   morphine injection 4 mg (4 mg Intravenous Given 7/29/23 1852)     Medical Decision Making:   History:   Old Medical Records: I decided to obtain old medical records.  Old Records Summarized: records from another hospital.       <> Summary of Records: 7/7/23 ED visit:  Patient complained of left foot pain for the past 4 months.  Patient has dry gangrene to left 2nd and 5th toe.  He has soft tissue swelling proximally.  Patient has been off of any diabetic or hypertensive medications for several years per patient.  His initial blood pressure is 212 over 79 with otherwise stable vital  signs.  Concern for peripheral vascular disease possible infection to his bone.  Patient is refusing any further workup.  States he was just here for pain control possible antibiotics.  Discussed the need for further evaluation of his foot including blood work and imaging however he was adamant that he does not want anything done as he was not ready for treatment.  I discussed that this will lead to possible worsening infection that could lead to amputation of his toes foot and leg ultimately could lead to permanent disability and death your he states he was not ready for any further workup in his refusing to give blood.  He was capacity make this decision he understands all treatment options in his sign again out AMA.  I refer him to primary care.  I will start him on amlodipine for blood pressure as well as metformin.  I will discharge him on Cipro and Bactrim and refer him to Podiatry.  He understands that he may return at any point time if he changes his mind about further evaluation of his gangrenous toes and uncontrolled medical conditions.  Initial Assessment:   53-year-old male with history of type 2 diabetes and hypertension presents to ED regarding left foot pain and gangrenous toes. Patient hypertensive but asymptomatic. Has not taken medications in several years. Other vitals stable. As seen in photos, 2nd and 5th toe with dry gangrene. Concerned for cellulitis and possible osteomyelitis. Also faint palpable DP. Will get CTA run off, concerned for PAD.  Differential Diagnosis:   My differential diagnoses include but are not limited to:   Dry gangrene, osteomyelitis, cellulitis, PAD, arterial occlusion   Clinical Tests:   Lab Tests: Ordered and Reviewed  Radiological Study: Ordered and Reviewed  ED Management:  Discussed with hospital medicine and will admit patient for acute osteomyelitis and hypertensive urgency. Patient remained asymptomatic with high BP during ED stay. Only had left foot pain that  required two doses of morphine. Was given two doses of hydralazine (HR mildly low so could not give BB) and able to get BP to 175/72 mmHg. Patient agreed to plan and place in observation.  I have reviewed the patient's records and discussed with my supervising physician.            Attending Attestation:     Physician Attestation Statement for NP/PA:   I have directed and reviewed the workup performed by the PA/NP.  I performed the substantive portion of the medical decision making.     Other NP/PA Attestation Additions:      Medical Decision Making: L foot concerning for dry gangrene of multiple toes, possible underlying OM, likely 2/2 PAD. Pedal pulses faintly dopplerable, no crepitus or warmth concerning for cellulitis or nec fasc, but will obtain CTA LE to eval vasculature as well as extent of soft tissue or OM damage.     I discussed the patient's care with Advanced Practice Clinician, and did see this patient with the APC.  I reviewed their note and agree with the history, physical, assessment, diagnosis, treatment, and disposition plan provided by the Advanced Practice Clinician.                  ED Course as of 08/05/23 1402   Sat Jul 29, 2023   1459 BP(!)(S): 247/110  Will give home dose of amlodipine [KB]   1511 Glucose(!): 212 [KB]   1512 Sed Rate(!): 66 [KB]   1512 CRP: 3.4 [KB]   1512 Hemoglobin(!): 11.6  Mildly anemic [KB]   1641 BP(!): 247/110  Ordered hydralazine [KB]   1641 Pulse(!): 54  Cannot give labetalol  [KB]   1738 BP(!): 211/96  Will give another dose hydralazine [KB]   1806 CT with contrast was ordered by me but then cancelled. Unknown why or by whom. CTA runoff was done. Spoke with radiology who does not see osteomyelitis or soft tissue infection but imaging is not appropriate. He recommended an x-ray or CT with noncon since patient cannot get anymore contrast, therefore x-ray ordered. [KB]   5783 X-Ray Foot Complete Left  Impression:     Osseous erosive changes or acute osteomyelitis of  the 2nd toe distal phalanx, progressed from prior. [KB]   1915 BP(!): 193/88 [KB]      ED Course User Index  [KB] Johana Dykes PA-C                   Clinical Impression:   Final diagnoses:  [M86.10] Acute osteomyelitis (Primary)  [I16.0] Asymptomatic hypertensive urgency  [M79.672] Left foot pain  [I73.9] Peripheral artery disease        ED Disposition Condition    Observation Stable                Johana Dykes PA-C  07/29/23 4195       Adilene Charles MD  08/07/23 1300

## 2023-07-29 NOTE — ED NOTES
"Rikki Graham, a 53 y.o. male presents to the ED via POV w/ complaint of  left foot pain. 2 necrotic toes noted upon arrival- 2nd tow and 5th toe of left foot. States thinks there is an infection in toe. When asked if diabetic, pt states he doesn't think so- DM listed in chart. States dropped frozen ham on foot "awhile ago" and "never got it checked out". AAOx4 and family at bedside. Ambulatory to intake without assist.     Triage note:  Chief Complaint   Patient presents with    Foot Problem     States left foot gangrene/ wants debridement    Wound Check     Review of patient's allergies indicates:  No Known Allergies  Past Medical History:   Diagnosis Date    Diabetes mellitus     Hypertension     Neuropathy       "

## 2023-07-30 VITALS
OXYGEN SATURATION: 95 % | HEART RATE: 76 BPM | RESPIRATION RATE: 20 BRPM | BODY MASS INDEX: 30.73 KG/M2 | DIASTOLIC BLOOD PRESSURE: 61 MMHG | HEIGHT: 64 IN | WEIGHT: 180 LBS | SYSTOLIC BLOOD PRESSURE: 137 MMHG | TEMPERATURE: 98 F

## 2023-07-30 PROBLEM — I16.0 HYPERTENSIVE URGENCY: Status: RESOLVED | Noted: 2023-07-29 | Resolved: 2023-07-30

## 2023-07-30 PROBLEM — I73.9 PAD (PERIPHERAL ARTERY DISEASE): Status: ACTIVE | Noted: 2023-07-30

## 2023-07-30 LAB
ANION GAP SERPL CALC-SCNC: 10 MMOL/L (ref 8–16)
BASOPHILS # BLD AUTO: 0.05 K/UL (ref 0–0.2)
BASOPHILS NFR BLD: 0.7 % (ref 0–1.9)
BUN SERPL-MCNC: 23 MG/DL (ref 6–20)
CALCIUM SERPL-MCNC: 9.8 MG/DL (ref 8.7–10.5)
CHLORIDE SERPL-SCNC: 108 MMOL/L (ref 95–110)
CO2 SERPL-SCNC: 20 MMOL/L (ref 23–29)
CREAT SERPL-MCNC: 1.3 MG/DL (ref 0.5–1.4)
DIFFERENTIAL METHOD: ABNORMAL
EOSINOPHIL # BLD AUTO: 0.4 K/UL (ref 0–0.5)
EOSINOPHIL NFR BLD: 5.3 % (ref 0–8)
ERYTHROCYTE [DISTWIDTH] IN BLOOD BY AUTOMATED COUNT: 13.7 % (ref 11.5–14.5)
EST. GFR  (NO RACE VARIABLE): >60 ML/MIN/1.73 M^2
ESTIMATED AVG GLUCOSE: 189 MG/DL (ref 68–131)
GLUCOSE SERPL-MCNC: 182 MG/DL (ref 70–110)
HBA1C MFR BLD: 8.2 % (ref 4–5.6)
HCT VFR BLD AUTO: 34.1 % (ref 40–54)
HGB BLD-MCNC: 11.5 G/DL (ref 14–18)
IMM GRANULOCYTES # BLD AUTO: 0.01 K/UL (ref 0–0.04)
IMM GRANULOCYTES NFR BLD AUTO: 0.1 % (ref 0–0.5)
LYMPHOCYTES # BLD AUTO: 1.7 K/UL (ref 1–4.8)
LYMPHOCYTES NFR BLD: 22.8 % (ref 18–48)
MAGNESIUM SERPL-MCNC: 2 MG/DL (ref 1.6–2.6)
MCH RBC QN AUTO: 30.4 PG (ref 27–31)
MCHC RBC AUTO-ENTMCNC: 33.7 G/DL (ref 32–36)
MCV RBC AUTO: 90 FL (ref 82–98)
MONOCYTES # BLD AUTO: 0.4 K/UL (ref 0.3–1)
MONOCYTES NFR BLD: 5.8 % (ref 4–15)
NEUTROPHILS # BLD AUTO: 4.7 K/UL (ref 1.8–7.7)
NEUTROPHILS NFR BLD: 65.3 % (ref 38–73)
NRBC BLD-RTO: 0 /100 WBC
PHOSPHATE SERPL-MCNC: 3.7 MG/DL (ref 2.7–4.5)
PLATELET # BLD AUTO: 285 K/UL (ref 150–450)
PMV BLD AUTO: 10.4 FL (ref 9.2–12.9)
POTASSIUM SERPL-SCNC: 4.4 MMOL/L (ref 3.5–5.1)
RBC # BLD AUTO: 3.78 M/UL (ref 4.6–6.2)
SODIUM SERPL-SCNC: 138 MMOL/L (ref 136–145)
WBC # BLD AUTO: 7.23 K/UL (ref 3.9–12.7)

## 2023-07-30 PROCEDURE — 87150 DNA/RNA AMPLIFIED PROBE: CPT | Mod: 59 | Performed by: PHYSICIAN ASSISTANT

## 2023-07-30 PROCEDURE — 83735 ASSAY OF MAGNESIUM: CPT | Performed by: PHYSICIAN ASSISTANT

## 2023-07-30 PROCEDURE — 21400001 HC TELEMETRY ROOM

## 2023-07-30 PROCEDURE — 99223 1ST HOSP IP/OBS HIGH 75: CPT | Mod: ,,, | Performed by: PODIATRIST

## 2023-07-30 PROCEDURE — 99223 1ST HOSP IP/OBS HIGH 75: CPT | Mod: ,,, | Performed by: SURGERY

## 2023-07-30 PROCEDURE — 99223 PR INITIAL HOSPITAL CARE,LEVL III: ICD-10-PCS | Mod: ,,, | Performed by: SURGERY

## 2023-07-30 PROCEDURE — 25000003 PHARM REV CODE 250: Performed by: PHYSICIAN ASSISTANT

## 2023-07-30 PROCEDURE — 36415 COLL VENOUS BLD VENIPUNCTURE: CPT | Performed by: PHYSICIAN ASSISTANT

## 2023-07-30 PROCEDURE — 99239 PR HOSPITAL DISCHARGE DAY,>30 MIN: ICD-10-PCS | Mod: ,,, | Performed by: HOSPITALIST

## 2023-07-30 PROCEDURE — 63600175 PHARM REV CODE 636 W HCPCS: Performed by: PHYSICIAN ASSISTANT

## 2023-07-30 PROCEDURE — 83036 HEMOGLOBIN GLYCOSYLATED A1C: CPT | Performed by: PHYSICIAN ASSISTANT

## 2023-07-30 PROCEDURE — 84100 ASSAY OF PHOSPHORUS: CPT | Performed by: PHYSICIAN ASSISTANT

## 2023-07-30 PROCEDURE — 85025 COMPLETE CBC W/AUTO DIFF WBC: CPT | Performed by: PHYSICIAN ASSISTANT

## 2023-07-30 PROCEDURE — 99223 PR INITIAL HOSPITAL CARE,LEVL III: ICD-10-PCS | Mod: ,,, | Performed by: PODIATRIST

## 2023-07-30 PROCEDURE — 99239 HOSP IP/OBS DSCHRG MGMT >30: CPT | Mod: ,,, | Performed by: HOSPITALIST

## 2023-07-30 PROCEDURE — 80048 BASIC METABOLIC PNL TOTAL CA: CPT | Performed by: PHYSICIAN ASSISTANT

## 2023-07-30 RX ORDER — ATORVASTATIN CALCIUM 80 MG/1
80 TABLET, FILM COATED ORAL DAILY
Qty: 90 TABLET | Refills: 3 | Status: SHIPPED | OUTPATIENT
Start: 2023-07-31 | End: 2023-07-30 | Stop reason: SDUPTHER

## 2023-07-30 RX ORDER — LEVOFLOXACIN 750 MG/1
750 TABLET ORAL DAILY
Qty: 7 TABLET | Refills: 0 | Status: SHIPPED | OUTPATIENT
Start: 2023-07-31 | End: 2023-07-30 | Stop reason: SDUPTHER

## 2023-07-30 RX ORDER — HYDROCODONE BITARTRATE AND ACETAMINOPHEN 5; 325 MG/1; MG/1
1 TABLET ORAL EVERY 12 HOURS PRN
Qty: 14 TABLET | Refills: 0 | Status: SHIPPED | OUTPATIENT
Start: 2023-07-30 | End: 2023-07-30 | Stop reason: SDUPTHER

## 2023-07-30 RX ORDER — ASPIRIN 81 MG/1
81 TABLET ORAL DAILY
Qty: 360 TABLET | Refills: 0
Start: 2023-07-31 | End: 2023-07-30 | Stop reason: SDUPTHER

## 2023-07-30 RX ORDER — ASPIRIN 81 MG/1
81 TABLET ORAL DAILY
Qty: 360 TABLET | Refills: 0
Start: 2023-07-31 | End: 2024-07-30

## 2023-07-30 RX ORDER — HYDROCODONE BITARTRATE AND ACETAMINOPHEN 5; 325 MG/1; MG/1
1 TABLET ORAL EVERY 12 HOURS PRN
Qty: 14 TABLET | Refills: 0 | Status: SHIPPED | OUTPATIENT
Start: 2023-07-30

## 2023-07-30 RX ORDER — ATORVASTATIN CALCIUM 80 MG/1
80 TABLET, FILM COATED ORAL DAILY
Qty: 90 TABLET | Refills: 3 | Status: SHIPPED | OUTPATIENT
Start: 2023-07-31 | End: 2024-07-30

## 2023-07-30 RX ORDER — LEVOFLOXACIN 750 MG/1
750 TABLET ORAL DAILY
Qty: 7 TABLET | Refills: 0 | Status: SHIPPED | OUTPATIENT
Start: 2023-07-31 | End: 2023-08-07

## 2023-07-30 RX ADMIN — INSULIN ASPART 3 UNITS: 100 INJECTION, SOLUTION INTRAVENOUS; SUBCUTANEOUS at 09:07

## 2023-07-30 RX ADMIN — ASPIRIN 81 MG: 81 TABLET, COATED ORAL at 09:07

## 2023-07-30 RX ADMIN — ATORVASTATIN CALCIUM 80 MG: 40 TABLET, FILM COATED ORAL at 09:07

## 2023-07-30 RX ADMIN — AMLODIPINE BESYLATE 10 MG: 10 TABLET ORAL at 09:07

## 2023-07-30 NOTE — HPI
Rikki Graham is a 53 year old male with a PMH of HTN, T2DM who presents with left foot pain. He first began having pain after dropping a frozen roast on his foot in April. This wound initially healed, but began to develop again about one month ago. Since that time, the second and fourth toes have become increasingly worse per patient. He currently works as a lawnmower and denies claudication symptoms as well as rest pain. HE denies a history of MI and stroke. He does have diabetes but says that he does not take his medication because it makes him nauseous. He is not on an ASA or statin. He does report a one and a half year history of smoking, about 2-3 cigarettes per day.    On evaluation, he is HDS on RA. Labs without leukocytosis. He does have an elevated Cr and BG in the 200s. CTA with multifocal stenosis bilaterally.

## 2023-07-30 NOTE — HPI
"53 y.o. male with a PMH of uncontrolled T2DM, HTN, neuropathy, hx of tobacco abuse who presents to [facility] due to concern for dry gangrene to 2nd and 5th digits of left foot, of which podiatry service consulted for. Patient's daughter in room at time of encounter. Patient had noticed a dark discoloration to his left 5th toe to which he had been treating with antibiotics. Patient states that he had given up tobacco use and drinking and eating healthier, and noticed that the dark discoloration had been receding, but then the toe had later become black. Patient had also noticed his left 2nd toe had attained a dark discoloration. 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 Patient reiterates that he needs to "take a few days off and handle some things" and it is not clear when he intends to return to hospital. Agitated affect at time of encounter. Denies nausea, fever. Right foot is WNL on exam. Patient has been discharged and left hospital at time of this note.   "

## 2023-07-30 NOTE — ASSESSMENT & PLAN NOTE
53 y.o. male with a PMH of uncontrolled T2DM, HTN, neuropathy, hx of tobacco abuse who [place] [facility] [place] concern for dry gangrene to 2nd and 5th digits of left foot, of which podiatry service consulted for. Very agitated at today's visit, states he is not amenable for hospital stay. At time of writing, patient has been discharged due to electing to leave hospital     · Examined and assessed patient at bedside; explained to patient plan of care and risks of denying further vascular studies  · Betadine painted onto affected digits with gauze  · Patient told to keep toes dry, apply betadine daily  · Will arrange for outpatient follow-up as patient seems non-compliant with hospital stay and it is not clear when he may choose to return.

## 2023-07-30 NOTE — ASSESSMENT & PLAN NOTE
- ongoing issue of the L 5th toe x3 months, worsening dry gangrene 2nd L toe x3-4 weeks  - CTA shows multifocal areas of moderate to high-grade stenosis of the BLEs  - vascular surgery consulted; appreciate recs  - Pain with betadine daily.

## 2023-07-30 NOTE — NURSING
Nurses Note -- 4 Eyes      7/30/2023   1:13 AM      Skin assessed during: Admit      [x] No Altered Skin Integrity Present    []Prevention Measures Documented      [] Yes- Altered Skin Integrity Present or Discovered   [] LDA Added if Not in Epic (Describe Wound)   [] New Altered Skin Integrity was Present on Admit and Documented in LDA   [] Wound Image Taken    Wound Care Consulted? No    Attending Nurse:  Shobha Lujan RN     Second RN/Staff Member:  April

## 2023-07-30 NOTE — DISCHARGE SUMMARY
[facility] Medicine  Discharge Summary      Patient Name: Rikki Graham  MRN: [id number]  SELVIN: [id number]  Patient Class: IP- Inpatient  Admission Date: 7/29/2023  Hospital Length of Stay: 1 days  Discharge Date and Time: 7/30/2023 11:50 AM  Attending Physician: Singh Butler MD  Discharging Provider: Singh Butler MD  Primary Care Provider: [facility] Medicine Team: [facility] Singh Butler MD  Primary Care Team: [facility]    HPI:   Rikki Graham is a 53 y.o. male with a PMHx of uncontrolled T2DM, HTN, neuropathy who presents to [facility] due to concern for dry gangrene to 2nd and 5th digits of left foot. Patient initially noted dark black discoloration to his left 5th toe in April for which he was seen at an OSH and diagnosed with dry gangrene. He was prescribed PO antibiotics and pain medications with slightly improvement in symptoms, however he never followed up so symptoms never completely resolved. He then developed pain, dark disloration, and a wound to his L 2nd toe ~3-4 weeks ago. He was again prescribed antibiotics and pain meds but never followed up with anyone on discharge so symptoms never got better. He presents today with persistent pain and dry gangrene to his L 2nd and 5th toes. He admits to non-compliance with his home medications because it makes him feels bad. Denies fever, chills, N/V, abdominal pain, chest pain, SOB, constipation, or syncope.    ED: hypertensive to /110, improved to SBP 170s s/p IV hydralazine x2. No leukocytosis or electrolyte abnormalities. CRP 3.4, ESR 66. XR L foot  shows acute osteomyelitis of the 2nd toe distal phalanx, progressed from prior. CTA bilat LEs shows extensive predominantly calcified atherosclerotic plaque throughout the bilateral lower extremities contributing to multifocal areas of moderate to high-grade stenosis.  Bilateral two-vessel runoff within the distal lower extremities. Given  IV vanc and zosyn.       * No surgery found *      Hospital Course:   CTA with runoff showed significant peripheral arterial disease. Vascular Surgery recommended further vascular lab evaluation with ABIs and toe indices prior to angiogram. Podiatry recommended at minimum amputation of the 2nd and 5th toes pending results of the Vascular Surgery evaluation and possible intervention to improve blood flow to foot in order to maximize wound healing. The patient was provided recommendations for his continued hospitalization and evaluation by Dr. Lagos with Podiatry, Dr. Balderrama with Vascular Surgery, his daughter, and myself, but he ultimately decided to leave [place] because he had to clear his head to prepare for these procedures and to take care of some personal business prior to week or more in the hospital. He says that he will return in a week to have these things done. He was provided a prescription for 1 week of levofloxacin 750 mg daily, ASA 81 mg daily and atorvastatin 80 mg daily.        Goals of Care Treatment Preferences:  Code Status: Full Code      Consults:   Consults (From admission, onward)        Status Ordering Provider     Inpatient consult to Vascular Surgery  Once        Provider:  (Not yet assigned)    BESSY Zuniga          Cardiac/Vascular  PAD (peripheral artery disease)  Started on ASA 81 mg daily and atorvastatin 80 mg daily.       ID  * Acute osteomyelitis of toe of left foot  - failed outpatient antiobitics  - afebrile without leukocytosis  - ESR 66, CRP 3.4  - XR L foot shows acute osteo of the 2nd toe distal phalanx  - podiatry consulted; appreciate recs  - s/p IV vanc and zosyn in the ED  - pain control     Endocrine  Type 2 diabetes mellitus with skin complication, without long-term current use of insulin  - Resume home orals  - diabetic diet    Orthopedic  Dry gangrene  - ongoing issue of the L 5th toe x3 months, worsening dry gangrene 2nd L toe x3-4 weeks  - CTA shows  multifocal areas of moderate to high-grade stenosis of the BLEs  - vascular surgery consulted; appreciate recs  - Pain with betadine daily.       Final Active Diagnoses:    Diagnosis Date Noted POA    PRINCIPAL PROBLEM:  Acute osteomyelitis of toe of left foot [M86.172] 07/29/2023 Yes    PAD (peripheral artery disease) [I73.9] 07/30/2023 Yes    Dry gangrene [I96] 07/29/2023 Yes    Benign essential hypertension [I10] 07/29/2023 Yes    Hyperlipidemia [E78.5] 07/29/2023 Yes    Type 2 diabetes mellitus with skin complication, without long-term current use of insulin [E11.628] 05/19/2023 Yes    Ulcer of left foot, with fat layer exposed [L97.522] 05/19/2023 Yes      Problems Resolved During this Admission:    Diagnosis Date Noted Date Resolved POA    Hypertensive urgency [I16.0] 07/29/2023 07/30/2023 Yes       Discharged Condition: against medical advice    Disposition: Left Against Medical Adv*    Follow Up:   Follow-up Information     Primary Doctor No .                     Patient Instructions:      Diet diabetic     Notify your health care provider if you experience any of the following:  temperature >100.4     Notify your health care provider if you experience any of the following:  severe uncontrolled pain     Notify your health care provider if you experience any of the following:  redness, tenderness, or signs of infection (pain, swelling, redness, odor or green/yellow discharge around incision site)     Change dressing (specify)   Order Comments: Apply betadine to 2nd and 5th toes of left foot daily. Can place gauze between toes.     Activity as tolerated       Significant Diagnostic Studies: Labs:   CMP   Recent Labs   Lab 07/29/23  1311 07/30/23  0230    138   K 4.4 4.4    108   CO2 22* 20*   * 182*   BUN 23* 23*   CREATININE 1.3 1.3   CALCIUM 9.8 9.8   PROT 6.9  --    ALBUMIN 3.3*  --    BILITOT 0.3  --    ALKPHOS 59  --    AST 16  --    ALT 13  --    ANIONGAP 10 10   , CBC   Recent  Labs   Lab 07/29/23  1311 07/30/23  0230   WBC 6.37 7.23   HGB 11.6* 11.5*   HCT 34.5* 34.1*    285    and A1C:   Recent Labs   Lab 07/30/23  0230   HGBA1C 8.2*     Radiology:   CTA with Runoff: Extensive predominantly calcified atherosclerotic plaque throughout the bilateral lower extremities contributing to multifocal areas of moderate to high-grade stenosis.  Bilateral two-vessel runoff within the distal lower extremities.  Extensive calcified atherosclerotic plaque throughout the mid to distal ORLY contributing to multifocal areas of at least moderate to high-grade stenosis.    Left foot xray: Osseous erosive changes or acute osteomyelitis of the 2nd toe distal phalanx, progressed from prior.       Pending Diagnostic Studies:     None         Medications:  Reconciled Home Medications:      Medication List      START taking these medications    aspirin 81 MG EC tablet  Commonly known as: ECOTRIN  Take 1 tablet (81 mg total) by mouth once daily.  Start taking on: July 31, 2023        CONTINUE taking these medications    amLODIPine 10 MG tablet  Commonly known as: NORVASC  Take 1 tablet (10 mg total) by mouth once daily.     cloNIDine 0.2 MG tablet  Commonly known as: CATAPRES  Take 0.2 mg by mouth every 12 (twelve) hours.     gabapentin 100 MG capsule  Commonly known as: NEURONTIN  Take 1 capsule (100 mg total) by mouth 3 (three) times daily.     glipiZIDE 5 MG tablet  Commonly known as: GLUCOTROL  Take 5 mg by mouth every morning.     ibuprofen 800 MG tablet  Commonly known as: ADVIL,MOTRIN  Take 1 tablet (800 mg total) by mouth every 6 (six) hours as needed for Pain.     metFORMIN 500 MG tablet  Commonly known as: GLUCOPHAGE  Take 1 tablet (500 mg total) by mouth daily with breakfast.        STOP taking these medications    amoxicillin 875 MG tablet  Commonly known as: AMOXIL     erythromycin ophthalmic ointment  Commonly known as: ROMYCIN     levoFLOXacin 750 MG tablet  Commonly known as: LEVAQUIN      sulfamethoxazole-trimethoprim 800-160mg 800-160 mg Tab  Commonly known as: BACTRIM DS            Indwelling Lines/Drains at time of discharge:   Lines/Drains/Airways     None                 Time spent on the discharge of patient: 75 minutes         Singh Butler MD  Department of Hospital Medicine  [facility] Surg

## 2023-07-30 NOTE — PLAN OF CARE
At this time, patient does not wish to stay inpatient for workup for PAD. Explained to patient the risks associated with untreated PAD and highly encouraged him to stay or for close follow up. Will arrange for outpatient follow up with imaging studies in one week.    Diana Gomez MD  General Surgery, PGY-3

## 2023-07-30 NOTE — CONSULTS
"[facility] - [facility] Surg  Podiatry  Consult Note    Patient Name: Rikki Graham  MRN: [id number]  Admission Date: 7/29/2023  Hospital Length of Stay: 1 days  Attending Physician: No att. providers found  Primary Care Provider: Primary Doctor No     Consults  Subjective:     History of Present Illness:  53 y.o. male with a PMH of uncontrolled T2DM, HTN, neuropathy, hx of tobacco abuse who presents to [facility] due to concern for dry gangrene to 2nd and 5th digits of left foot, of which podiatry service consulted for. Patient's daughter in room at time of encounter. Patient had noticed a dark discoloration to his left 5th toe to which he had been treating with antibiotics. Patient states that he had given up tobacco use and drinking and eating healthier, and noticed that the dark discoloration had been receding, but then the toe had later become black. Patient had also noticed his left 2nd toe had attained a dark discoloration. 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 Patient reiterates that he needs to "take a few days off and handle some things" and after much deliberation concerning plan of care and risks, it is not clear when he intends to return to hospital. Agitated affect at time of encounter. Denies nausea, fever. Right foot is WNL on exam. Patient has been discharged and left hospital at time of this note.       Scheduled Meds:  Continuous Infusions:  PRN Meds:    Review of patient's allergies indicates:  No Known Allergies     Past Medical History:   Diagnosis Date    Diabetes mellitus     Hypertension     Neuropathy      History reviewed. No pertinent surgical history.    Family History    None       Tobacco Use    Smoking status: Never    Smokeless tobacco: Never   Substance and Sexual Activity    Alcohol use: Not on file    Drug use: Not on file    Sexual activity: Not on file     Review of Systems   Constitutional:  " Negative for chills, fatigue and fever.   Respiratory:  Negative for cough and shortness of breath.    Cardiovascular:  Negative for chest pain, palpitations and leg swelling.   Skin:  Positive for color change and wound.   Neurological:  Negative for weakness and numbness.   All other systems reviewed and are negative.    Objective:     Vital Signs (Most Recent):  Temp: 98.4 °F (36.9 °C) (07/30/23 0411)  Pulse: 76 (07/30/23 0411)  Resp: 20 (07/30/23 0411)  BP: 137/61 (07/30/23 0411)  SpO2: 95 % (07/30/23 0411) Vital Signs (24h Range):  Temp:  [98.3 °F (36.8 °C)-98.7 °F (37.1 °C)] 98.4 °F (36.9 °C)  Pulse:  [54-79] 76  Resp:  [16-20] 20  SpO2:  [95 %-98 %] 95 %  BP: (131-247)/() 137/61     Weight: 81.6 kg (180 lb)  Body mass index is 30.9 kg/m².    Foot Exam    General  General Appearance: appears stated age and healthy   Orientation: alert and oriented to person, place, and time       Right Foot/Ankle     Inspection and Palpation  Ecchymosis: none  Tenderness: none   Swelling: none   Skin Exam: skin intact;       Left Foot/Ankle      Inspection and Palpation  Skin Exam: skin not intact     Neurovascular  Dorsalis pedis: 1+    Comments  (See photographs)    Patient has dry, gangrenous 2nd and 5th digits with dark discoloration. Dry to the touch.       Laboratory:  Blood Cultures:   Recent Labs   Lab 07/29/23 2152   LABBLOO No Growth to date  No Growth to date     BMP:   Recent Labs   Lab 07/30/23 0230   *      K 4.4      CO2 20*   BUN 23*   CREATININE 1.3   CALCIUM 9.8   MG 2.0     CBC:   Recent Labs   Lab 07/30/23 0230   WBC 7.23   RBC 3.78*   HGB 11.5*   HCT 34.1*      MCV 90   MCH 30.4   MCHC 33.7     CMP:   Recent Labs   Lab 07/29/23  1311 07/30/23  0230   * 182*   CALCIUM 9.8 9.8   ALBUMIN 3.3*  --    PROT 6.9  --     138   K 4.4 4.4   CO2 22* 20*    108   BUN 23* 23*   CREATININE 1.3 1.3   ALKPHOS 59  --    ALT 13  --    AST 16  --    BILITOT 0.3  --   "    CRP:   Recent Labs   Lab 07/29/23  1311   CRP 3.4     Wound Cultures: No results for input(s): "LABAERO" in the last 4320 hours.  Microbiology Results (last 7 days)       Procedure Component Value Units Date/Time    Blood culture (site 1) [[id number]] Collected: 07/29/23 2152    Order Status: Completed Specimen: Blood from Peripheral, Hand, Right Updated: 07/30/23 0715     Blood Culture, Routine No Growth to date    Narrative:      Site # 1, aerobic and anaerobic    Blood culture (site 2) [[id number]] Collected: 07/29/23 2152    Order Status: Completed Specimen: Blood from Peripheral, Antecubital, Right Updated: 07/30/23 0715     Blood Culture, Routine No Growth to date    Narrative:      Site # 2, aerobic only          Specimen (24h ago, onward)      None          All pertinent labs reviewed within the last 24 hours.    Diagnostic Results:  X-Ray: I have reviewed all pertinent results/findings within the past 24 hours.     I have reviewed all pertinent imaging results/findings within the past 24 hours.    Clinical Findings:            Assessment/Plan:     Orthopedic  Dry gangrene  53 y.o. male with a PMH of uncontrolled T2DM, HTN, neuropathy, hx of tobacco abuse who [place] [facility] [place] to concern for dry gangrene to 2nd and 5th digits of left foot, of which podiatry service consulted for. Very agitated at today's visit, states he is not amenable for hospital stay. At time of writing, patient has been discharged due to electing to leave hospital     Examined and assessed patient at bedside; explained to patient plan of care and risks of denying further vascular studies  Betadine painted onto affected digits with gauze  Patient told to keep toes dry, apply betadine daily  Recommend follow-up podiatry outpatient if not at hospital.        Thank you for your consult. I will follow-up with patient. Please contact us if you have any additional questions.    Jamarcus Moreira MD  Podiatry  [facility] - Med Surg  "

## 2023-07-30 NOTE — HPI
Rikki Graham is a 53 y.o. male with a PMHx of uncontrolled T2DM, HTN, neuropathy who presents to [facility] due to concern for dry gangrene to 2nd and 5th digits of left foot. Patient initially noted dark black discoloration to his left 5th toe in April for which he was seen at an OSH and diagnosed with dry gangrene. He was prescribed PO antibiotics and pain medications with slightly improvement in symptoms, however he never followed up so symptoms never completely resolved. He then developed pain, dark disloration, and a wound to his L 2nd toe ~3-4 weeks ago. He was again prescribed antibiotics and pain meds but never followed up with anyone on discharge so symptoms never got better. He presents today with persistent pain and dry gangrene to his L 2nd and 5th toes. He admits to non-compliance with his home medications because it makes him feels bad. Denies fever, chills, N/V, abdominal pain, chest pain, SOB, constipation, or syncope.    ED: hypertensive to /110, improved to SBP 170s s/p IV hydralazine x2. No leukocytosis or electrolyte abnormalities. CRP 3.4, ESR 66. XR L foot  shows acute osteomyelitis of the 2nd toe distal phalanx, progressed from prior. CTA bilat LEs shows extensive predominantly calcified atherosclerotic plaque throughout the bilateral lower extremities contributing to multifocal areas of moderate to high-grade stenosis.  Bilateral two-vessel runoff within the distal lower extremities. Given IV vanc and zosyn.

## 2023-07-30 NOTE — H&P
[facility] - Emergency Dept  Hospital Medicine  History & Physical    Patient Name: Rikki Graham  MRN: [id number]  Patient Class: OP- Observation  Admission Date: 7/29/2023  Attending Physician: Carl Costello MD   Primary Care Provider: Primary Doctor No         Patient information was obtained from patient, past medical records and ER records.     Subjective:     Principal Problem:Acute osteomyelitis of toe of left foot    Chief Complaint:   Chief Complaint   Patient presents with    Foot Problem     States left foot gangrene/ wants debridement    Wound Check        HPI: Rikki Graham is a 53 y.o. male with a PMHx of uncontrolled T2DM, HTN, neuropathy who presents to [facility] [place] due to concern for dry gangrene to 2nd and 5th digits of left foot. Patient initially noted dark black discoloration to his left 5th toe in April for which he was seen at an OSH and diagnosed with dry gangrene. He was prescribed PO antibiotics and pain medications with slightly improvement in symptoms, however he never followed up so symptoms never completely resolved. He then developed pain, dark disloration, and a wound to his L 2nd toe ~3-4 weeks ago. He was again prescribed antibiotics and pain meds but never followed up with anyone on discharge so symptoms never got better. He presents today with persistent pain and dry gangrene to his L 2nd and 5th toes. He admits to non-compliance with his home medications because it makes him feels bad. Denies fever, chills, N/V, abdominal pain, chest pain, SOB, constipation, or syncope.    ED: hypertensive to /110, improved to SBP 170s s/p IV hydralazine x2. No leukocytosis or electrolyte abnormalities. CRP 3.4, ESR 66. XR L foot  shows acute osteomyelitis of the 2nd toe distal phalanx, progressed from prior. CTA bilat LEs shows extensive predominantly calcified atherosclerotic plaque throughout the bilateral lower extremities contributing to multifocal areas of moderate to high-grade  stenosis.  Bilateral two-vessel runoff within the distal lower extremities. Given IV vanc and zosyn.       Past Medical History:   Diagnosis Date    Diabetes mellitus     Hypertension     Neuropathy        History reviewed. No pertinent surgical history.    Review of patient's allergies indicates:  No Known Allergies    No current facility-administered medications on file prior to encounter.     Current Outpatient Medications on File Prior to Encounter   Medication Sig    amLODIPine (NORVASC) 10 MG tablet Take 1 tablet (10 mg total) by mouth once daily.    amoxicillin (AMOXIL) 875 MG tablet Take 1 tablet (875 mg total) by mouth 2 (two) times daily.    cloNIDine (CATAPRES) 0.2 MG tablet Take 0.2 mg by mouth every 12 (twelve) hours.    erythromycin (ROMYCIN) ophthalmic ointment Place a 1/2 inch ribbon of ointment into the lower eyelid three times a day    gabapentin (NEURONTIN) 100 MG capsule Take 1 capsule (100 mg total) by mouth 3 (three) times daily.    glipiZIDE (GLUCOTROL) 5 MG tablet Take 5 mg by mouth every morning.    ibuprofen (ADVIL,MOTRIN) 800 MG tablet Take 1 tablet (800 mg total) by mouth every 6 (six) hours as needed for Pain.    levoFLOXacin (LEVAQUIN) 750 MG tablet Take 750 mg by mouth 3 (three) times daily.    metFORMIN (GLUCOPHAGE) 500 MG tablet Take 1 tablet (500 mg total) by mouth daily with breakfast.    sulfamethoxazole-trimethoprim 800-160mg (BACTRIM DS) 800-160 mg Tab Take 1 tablet by mouth 2 (two) times daily.     Family History    Family history reviewed.        Tobacco Use    Smoking status: Never    Smokeless tobacco: Never   Substance and Sexual Activity    Alcohol use: Not on file    Drug use: Not on file    Sexual activity: Not on file     Review of Systems   Constitutional:  Negative for activity change, chills and fever.   HENT:  Negative for congestion, trouble swallowing and voice change.    Eyes:  Negative for photophobia and visual disturbance.   Respiratory:  Negative for chest  tightness, shortness of breath and wheezing.    Cardiovascular:  Negative for chest pain, palpitations and leg swelling.   Gastrointestinal:  Negative for abdominal pain, constipation, diarrhea, nausea and vomiting.   Genitourinary:  Negative for dysuria, frequency, hematuria and urgency.   Musculoskeletal:  Positive for arthralgias. Negative for back pain and gait problem.   Skin:  Positive for color change and wound. Negative for rash.   Neurological:  Negative for dizziness, syncope, weakness, light-headedness, numbness and headaches.   Psychiatric/Behavioral:  Negative for agitation and confusion. The patient is not nervous/anxious.      Objective:     Vital Signs (Most Recent):  Temp: 98.3 °F (36.8 °C) (07/29/23 1948)  Pulse: 79 (07/29/23 1948)  Resp: 18 (07/29/23 1948)  BP: (!) 175/80 (07/29/23 1948)  SpO2: 96 % (07/29/23 1948) Vital Signs (24h Range):  Temp:  [97.9 °F (36.6 °C)-98.3 °F (36.8 °C)] 98.3 °F (36.8 °C)  Pulse:  [54-79] 79  Resp:  [16-18] 18  SpO2:  [95 %-98 %] 96 %  BP: (175-247)/() 175/80     Weight: 81.6 kg (180 lb)  Body mass index is 30.9 kg/m².     Physical Exam  Vitals and nursing note reviewed.   Constitutional:       General: He is not in acute distress.     Appearance: He is well-developed.   HENT:      Head: Normocephalic and atraumatic.      Mouth/Throat:      Pharynx: No oropharyngeal exudate.   Eyes:      General: No scleral icterus.     Conjunctiva/sclera: Conjunctivae normal.   Cardiovascular:      Rate and Rhythm: Normal rate and regular rhythm.      Heart sounds: Normal heart sounds.   Pulmonary:      Effort: Pulmonary effort is normal. No respiratory distress.      Breath sounds: Normal breath sounds. No wheezing.   Abdominal:      General: Bowel sounds are normal. There is no distension.      Palpations: Abdomen is soft.      Tenderness: There is no abdominal tenderness.   Musculoskeletal:         General: Tenderness present. Normal range of motion.      Cervical back:  Normal range of motion and neck supple.   Lymphadenopathy:      Cervical: No cervical adenopathy.   Skin:     General: Skin is warm and dry.      Capillary Refill: Capillary refill takes less than 2 seconds.      Findings: No rash.      Comments: Dry gangrene of L 2nd and 5th toe. See media   Neurological:      Mental Status: He is alert and oriented to person, place, and time.      Cranial Nerves: No cranial nerve deficit.      Sensory: No sensory deficit.      Coordination: Coordination normal.   Psychiatric:         Behavior: Behavior normal.         Thought Content: Thought content normal.         Judgment: Judgment normal.                          Significant Labs: All pertinent labs within the past 24 hours have been reviewed.  BMP:   Recent Labs   Lab 07/29/23  1311   *      K 4.4      CO2 22*   BUN 23*   CREATININE 1.3   CALCIUM 9.8     CBC:   Recent Labs   Lab 07/29/23  1311   WBC 6.37   HGB 11.6*   HCT 34.5*          Significant Imaging: I have reviewed all pertinent imaging results/findings within the past 24 hours.  CTA Runoff ABD PEL Bilat Lower Ext  Narrative: EXAMINATION:  CTA RUNOFF ABD PEL BILAT LOWER EXT    CLINICAL HISTORY:  Claudication or leg ischemia;    TECHNIQUE:  Using 100 cc of  Omnipaque 350 IV contrast, and multi-detector helical CT technique, axial CT angiogram images of the abdomen/pelvis with lower extremity runoff.  2D post-processing coronal and sagittal reconstructions of the abdominal aorta, visceral arteries, and lower extremities performed.    COMPARISON:  None    FINDINGS:  VASCULATURE:    There is no evidence of aortic dissection, aneurysm, or stenosis.  There is minimal atherosclerotic change of the abdominal aorta.  There is mild atherosclerosis of the bilateral common and external iliac arteries without aneurysm.  There is extensive atherosclerotic change involving the bilateral internal iliac and femoral arteries.  There are multifocal areas of  moderate to high-grade stenosis within the distal bilateral internal iliac arteries.    The celiac artery and SMA demonstrate no significant stenosis.  The ORLY is patent at its origin with extensive calcific atherosclerotic change within its distal branches contributing to multifocal areas of at least moderate to high-grade stenosis.    The right and left renal arteries are patent without significant stenosis.    Right lower extremity:    - Common femoral: Patent with extensive predominantly calcified atherosclerotic plaque but no apparent flow-limiting stenosis.    - SFA: Patent with extensive predominantly calcified atherosclerotic plaque contributing to skip areas of moderate to high-grade stenosis.    - Profunda: Patent with extensive predominantly calcified atherosclerotic plaque contributing to skip areas of moderate to high-grade stenosis.    - Popliteal: Patent with extensive predominantly calcified atherosclerotic plaque contributing to high-grade stenosis.    - Runoff: Anterior and posterior tibial arteries are patent.  There is occlusion of the fibular artery midway through its course.    Left lower extremity:    - Common femoral: Patent with extensive predominantly calcified atherosclerotic plaque but no apparent flow-limiting stenosis.    - SFA: Patent with extensive predominantly calcified atherosclerotic plaque contributing to skip areas of moderate to high-grade stenosis.    - Profunda: Patent with extensive predominantly calcified atherosclerotic plaque contributing to skip areas of moderate to high-grade stenosis.    - Popliteal: Patent with extensive predominantly calcified atherosclerotic plaque contributing to high-grade stenosis.    - Runoff: Tibioperoneal trunk is patent.  There is occlusion of the anterior tibial artery proximally.    ABDOMEN/PELVIS:    LUNG BASES: Extensive multivessel coronary artery calcifications.  Lung bases are well aerated.  No pleural effusion    HEPATOBILIARY: Liver  is normal in size.  No focal hepatic lesions. No biliary ductal dilatation. Normal gallbladder.    SPLEEN: No splenomegaly.    PANCREAS: No focal masses or ductal dilatation.    ADRENALS: No adrenal nodules.    KIDNEYS/URETERS: Kidneys are normal in size and enhancement.  No hydronephrosis, stones, or solid mass lesions.    BLADDER/PELVIC ORGANS: No bladder wall thickening.  Vas deferens calcifications noted.  Prostate is within normal limits.    PERITONEUM / RETROPERITONEUM: No free air or fluid.    LYMPH NODES: No lymphadenopathy.    GI TRACT: No distention or wall thickening.  Normal appendix.    SOFT TISSUES: Tiny fat containing umbilical hernia.    BONES: No fractures or focal osseous lesions.  Impression: Extensive predominantly calcified atherosclerotic plaque throughout the bilateral lower extremities contributing to multifocal areas of moderate to high-grade stenosis.  Bilateral two-vessel runoff within the distal lower extremities.  Findings are detailed above.    Extensive calcified atherosclerotic plaque throughout the mid to distal ORLY contributing to multifocal areas of at least moderate to high-grade stenosis.    Other findings above.    Electronically signed by resident: Teodoro Guallpa  Date:    07/29/2023  Time:    16:54    Electronically signed by: Danny Rodriguez MD  Date:    07/29/2023  Time:    19:18  X-Ray Foot Complete Left  Narrative: EXAMINATION:  XR FOOT COMPLETE 3 VIEW LEFT    CLINICAL HISTORY:  .  Pain in left foot    TECHNIQUE:  AP, lateral and oblique views of the left foot were performed.    COMPARISON:  03/31/2023.    FINDINGS:  There was a changes of the 2nd toe distal phalanx, progressed from prior.  Remaining osseous structures are unremarkable.  Alignment is normal.  Joint spaces are preserved.  There are vascular calcifications.  Alignment is normal. The Lisfranc articulation is congruent. Joint spaces are preserved.  Impression: Osseous erosive changes or acute osteomyelitis of the  2nd toe distal phalanx, progressed from prior.    Electronically signed by: Can Hutchison  Date:    07/29/2023  Time:    18:54        Assessment/Plan:     * Acute osteomyelitis of toe of left foot  - failed outpatient antiobitics  - afebrile without leukocytosis  - ESR 66, CRP 3.4  - XR L foot shows acute osteo of the 2nd toe distal phalanx  - podiatry consulted; appreciate recs  - NPO at [facility]  - s/p IV vanc and zosyn in the ED  - will hold on further antibiotics for now until bone bx obtained  - pain control     Dry gangrene  - ongoing issue of the L 5th toe x3 months, worsening dry gangrene 2nd L toe x3-4 weeks  - CTA shows multifocal areas of moderate to high-grade stenosis of the BLEs  - vascular surgery consulted; appreciate recs  - NPO at [facility] as precaution  - start HI statin    Type 2 diabetes mellitus with skin complication, without long-term current use of insulin  - hold home orals  - start IP regimen: detemir 10U qhs + aspart 3U TIDWM  - LDSSI, ACHS accuchecks  - diabetic diet  - repeat a1c    Hypertensive urgency  - uncontrolled BP in the setting of medication non-compliance  - resume home amlodipine 10mg daily  - add prn hydralazine  - check TTE  - may need additional BP meds if still uncontrolled in AM      VTE Risk Mitigation (From admission, onward)           Ordered     enoxaparin injection 40 mg  Daily         07/29/23 2034     IP VTE HIGH RISK PATIENT  Once         07/29/23 2034                         On 07/29/2023, patient should be placed in hospital observation services under my care in collaboration with Dr. Carl Costello.      Joann Whitehead PA-C  Department of Hospital Medicine  [facility] - Emergency Dept

## 2023-07-30 NOTE — SUBJECTIVE & OBJECTIVE
(Not in a hospital admission)      Review of patient's allergies indicates:  No Known Allergies    Past Medical History:   Diagnosis Date    Diabetes mellitus     Hypertension     Neuropathy      History reviewed. No pertinent surgical history.  Family History    None       Tobacco Use    Smoking status: Never    Smokeless tobacco: Never   Substance and Sexual Activity    Alcohol use: Not on file    Drug use: Not on file    Sexual activity: Not on file     Review of Systems   Constitutional:  Negative for chills, fatigue and fever.   Respiratory:  Negative for cough and shortness of breath.    Cardiovascular:  Negative for chest pain, palpitations and leg swelling.   Skin:  Positive for color change and wound.   Neurological:  Negative for weakness and numbness.   All other systems reviewed and are negative.    Objective:     Vital Signs (Most Recent):  Temp: 98.3 °F (36.8 °C) (07/29/23 1948)  Pulse: 79 (07/29/23 1948)  Resp: 18 (07/29/23 1948)  BP: (!) 175/80 (07/29/23 1948)  SpO2: 96 % (07/29/23 1948) Vital Signs (24h Range):  Temp:  [97.9 °F (36.6 °C)-98.3 °F (36.8 °C)] 98.3 °F (36.8 °C)  Pulse:  [54-79] 79  Resp:  [16-18] 18  SpO2:  [95 %-98 %] 96 %  BP: (175-247)/() 175/80     Weight: 81.6 kg (180 lb)  Body mass index is 30.9 kg/m².      Physical Exam  Vitals reviewed.   Constitutional:       General: He is not in acute distress.  HENT:      Head: Normocephalic and atraumatic.   Cardiovascular:      Rate and Rhythm: Normal rate and regular rhythm.      Comments: 2+ bilateral femoral pulses  L: monophasic DP and biphasic PT  R: biphasic DP and PT  Pulmonary:      Effort: Pulmonary effort is normal. No respiratory distress.   Skin:     General: Skin is warm and dry.      Comments: See photos of wounds below   Neurological:      General: No focal deficit present.      Mental Status: He is alert and oriented to person, place, and time.                    Significant Labs:  CBC:   Recent Labs   Lab  07/29/23  1311   WBC 6.37   RBC 3.85*   HGB 11.6*   HCT 34.5*      MCV 90   MCH 30.1   MCHC 33.6     CMP:   Recent Labs   Lab 07/29/23  1311   *   CALCIUM 9.8   ALBUMIN 3.3*   PROT 6.9      K 4.4   CO2 22*      BUN 23*   CREATININE 1.3   ALKPHOS 59   ALT 13   AST 16   BILITOT 0.3     All pertinent labs from the last 24 hours have been reviewed.    Significant Diagnostics:  I have reviewed all pertinent imaging results/findings within the past 24 hours.

## 2023-07-30 NOTE — PROGRESS NOTES
[facility]  Vascular Surgery  Progress Note    Patient Name: Rikki Graham  MRN: [id number]  Admission Date: 7/29/2023  Primary Care Provider: Primary Doctor No    Subjective:     Interval History: No acute events overnight. HDS on RA. Pain has improved.    Post-Op Info:  * No surgery found *           Medications:  Continuous Infusions:  Scheduled Meds:   amLODIPine  10 mg Oral Daily    aspirin  81 mg Oral Daily    atorvastatin  80 mg Oral Daily    enoxparin  40 mg Subcutaneous Daily    insulin aspart U-100  3 Units Subcutaneous TIDWM    insulin detemir U-100  10 Units Subcutaneous QHS     PRN Meds:acetaminophen, bisacodyL, glucagon (human recombinant), glucose, glucose, insulin aspart U-100, melatonin, ondansetron, oxyCODONE, oxyCODONE, polyethylene glycol, promethazine     Objective:     Vital Signs (Most Recent):  Temp: 98.4 °F (36.9 °C) (07/30/23 0411)  Pulse: 76 (07/30/23 0411)  Resp: 20 (07/30/23 0411)  BP: 137/61 (07/30/23 0411)  SpO2: 95 % (07/30/23 0411) Vital Signs (24h Range):  Temp:  [97.9 °F (36.6 °C)-98.7 °F (37.1 °C)] 98.4 °F (36.9 °C)  Pulse:  [54-79] 76  Resp:  [16-20] 20  SpO2:  [95 %-98 %] 95 %  BP: (131-247)/() 137/61          Physical Exam  Vitals reviewed.   Constitutional:       General: He is not in acute distress.  HENT:      Head: Normocephalic and atraumatic.   Cardiovascular:      Rate and Rhythm: Normal rate and regular rhythm.      Comments: 2+ bilateral femoral pulses  L: monophasic DP and biphasic PT  R: biphasic DP and PT  Pulmonary:      Effort: Pulmonary effort is normal. No respiratory distress.   Skin:     General: Skin is warm and dry.      Comments: See photos of wounds below   Neurological:      General: No focal deficit present.      Mental Status: He is alert and oriented to person, place, and time.                Significant Labs:  CBC:   Recent Labs   Lab 07/30/23  0230   WBC 7.23   RBC 3.78*   HGB 11.5*   HCT 34.1*      MCV 90   MCH 30.4    [facility] 33.7     CMP:   Recent Labs   Lab 07/29/23  1311 07/30/23  0230   * 182*   CALCIUM 9.8 9.8   ALBUMIN 3.3*  --    PROT 6.9  --     138   K 4.4 4.4   CO2 22* 20*    108   BUN 23* 23*   CREATININE 1.3 1.3   ALKPHOS 59  --    ALT 13  --    AST 16  --    BILITOT 0.3  --      All pertinent labs from the last 24 hours have been reviewed.    Significant Diagnostics:  I have reviewed all pertinent imaging results/findings within the past 24 hours.    Assessment/Plan:     * Acute osteomyelitis of toe of left foot  53 year old male with PMH of HTN, T2DM (untreated) who presents with dry gangrene of the left second and fourth toe.    -DEVYN, toe pressures, arterial US ordered.  -ASA/statin  -Podiatry consult  -Strict glucose control  -Betadine paint to gangrenous toes  -Discussed smoking cessation with patient. He is amenable to quitting at this time.   -Tentatively planning angio this week. Timing TBD. Patient expressed desire to leave hospital and return for intervention. Counseled him that we recommend him to stay inpatient for his workup and treatment.         Diana Gomez MD  Vascular Surgery  [facility] Med Surg

## 2023-07-30 NOTE — SUBJECTIVE & OBJECTIVE
Medications:  Continuous Infusions:  Scheduled Meds:   amLODIPine  10 mg Oral Daily    aspirin  81 mg Oral Daily    atorvastatin  80 mg Oral Daily    enoxparin  40 mg Subcutaneous Daily    insulin aspart U-100  3 Units Subcutaneous TIDWM    insulin detemir U-100  10 Units Subcutaneous QHS     PRN Meds:acetaminophen, bisacodyL, glucagon (human recombinant), glucose, glucose, insulin aspart U-100, melatonin, ondansetron, oxyCODONE, oxyCODONE, polyethylene glycol, promethazine     Objective:     Vital Signs (Most Recent):  Temp: 98.4 °F (36.9 °C) (07/30/23 0411)  Pulse: 76 (07/30/23 0411)  Resp: 20 (07/30/23 0411)  BP: 137/61 (07/30/23 0411)  SpO2: 95 % (07/30/23 0411) Vital Signs (24h Range):  Temp:  [97.9 °F (36.6 °C)-98.7 °F (37.1 °C)] 98.4 °F (36.9 °C)  Pulse:  [54-79] 76  Resp:  [16-20] 20  SpO2:  [95 %-98 %] 95 %  BP: (131-247)/() 137/61          Physical Exam  Vitals reviewed.   Constitutional:       General: He is not in acute distress.  HENT:      Head: Normocephalic and atraumatic.   Cardiovascular:      Rate and Rhythm: Normal rate and regular rhythm.      Comments: 2+ bilateral femoral pulses  L: monophasic DP and biphasic PT  R: biphasic DP and PT  Pulmonary:      Effort: Pulmonary effort is normal. No respiratory distress.   Skin:     General: Skin is warm and dry.      Comments: See photos of wounds below   Neurological:      General: No focal deficit present.      Mental Status: He is alert and oriented to person, place, and time.                Significant Labs:  CBC:   Recent Labs   Lab 07/30/23  0230   WBC 7.23   RBC 3.78*   HGB 11.5*   HCT 34.1*      MCV 90   MCH 30.4   MCHC 33.7     CMP:   Recent Labs   Lab 07/29/23  1311 07/30/23  0230   * 182*   CALCIUM 9.8 9.8   ALBUMIN 3.3*  --    PROT 6.9  --     138   K 4.4 4.4   CO2 22* 20*    108   BUN 23* 23*   CREATININE 1.3 1.3   ALKPHOS 59  --    ALT 13  --    AST 16  --    BILITOT 0.3  --      All pertinent labs from  the last 24 hours have been reviewed.    Significant Diagnostics:  I have reviewed all pertinent imaging results/findings within the past 24 hours.

## 2023-07-30 NOTE — ASSESSMENT & PLAN NOTE
53 year old male with PMH of HTN, T2DM (untreated) who presents with dry gangrene of the left second and fourth toe.    -DEVYN, toe pressures, arterial US ordered.  -ASA/statin  -Podiatry consult  -Strict glucose control  -Betadine paint to gangrenous toes  -Discussed smoking cessation with patient. He is amenable to quitting at this time.   -Tentatively planning angio this week. Timing TBD. Patient expressed desire to leave hospital and return for intervention. Counseled him that we recommend him to stay inpatient for his workup and treatment.

## 2023-07-30 NOTE — ASSESSMENT & PLAN NOTE
- ongoing issue of the L 5th toe x3 months, worsening dry gangrene 2nd L toe x3-4 weeks  - CTA shows multifocal areas of moderate to high-grade stenosis of the BLEs  - vascular surgery consulted; appreciate recs  - NPO at [facility] as precaution  - start HI statin

## 2023-07-30 NOTE — SUBJECTIVE & OBJECTIVE
Scheduled Meds:  Continuous Infusions:  PRN Meds:    Review of patient's allergies indicates:  No Known Allergies     Past Medical History:   Diagnosis Date    Diabetes mellitus     Hypertension     Neuropathy      History reviewed. No pertinent surgical history.    Family History    None       Tobacco Use    Smoking status: Never    Smokeless tobacco: Never   Substance and Sexual Activity    Alcohol use: Not on file    Drug use: Not on file    Sexual activity: Not on file     Review of Systems   Constitutional:  Negative for chills, fatigue and fever.   Respiratory:  Negative for cough and shortness of breath.    Cardiovascular:  Negative for chest pain, palpitations and leg swelling.   Skin:  Positive for color change and wound.   Neurological:  Negative for weakness and numbness.   All other systems reviewed and are negative.    Objective:     Vital Signs (Most Recent):  Temp: 98.4 °F (36.9 °C) (07/30/23 0411)  Pulse: 76 (07/30/23 0411)  Resp: 20 (07/30/23 0411)  BP: 137/61 (07/30/23 0411)  SpO2: 95 % (07/30/23 0411) Vital Signs (24h Range):  Temp:  [98.3 °F (36.8 °C)-98.7 °F (37.1 °C)] 98.4 °F (36.9 °C)  Pulse:  [54-79] 76  Resp:  [16-20] 20  SpO2:  [95 %-98 %] 95 %  BP: (131-247)/() 137/61     Weight: 81.6 kg (180 lb)  Body mass index is 30.9 kg/m².    Foot Exam    General  General Appearance: appears stated age and healthy   Orientation: alert and oriented to person, place, and time       Right Foot/Ankle     Inspection and Palpation  Ecchymosis: none  Tenderness: none   Swelling: none   Skin Exam: skin intact;       Left Foot/Ankle      Inspection and Palpation  Skin Exam: skin not intact     Neurovascular  Dorsalis pedis: 1+    Comments  (See photographs)    Patient has dry, gangrenous 2nd and 5th digits with dark discoloration. Dry to the touch.       Laboratory:  Blood Cultures:   Recent Labs   Lab 07/29/23  6212   LABBLOO No Growth to date  No Growth to date     BMP:   Recent Labs   Lab  "07/30/23  0230   *      K 4.4      CO2 20*   BUN 23*   CREATININE 1.3   CALCIUM 9.8   MG 2.0     CBC:   Recent Labs   Lab 07/30/23  0230   WBC 7.23   RBC 3.78*   HGB 11.5*   HCT 34.1*      MCV 90   MCH 30.4   MCHC 33.7     CMP:   Recent Labs   Lab 07/29/23  1311 07/30/23  0230   * 182*   CALCIUM 9.8 9.8   ALBUMIN 3.3*  --    PROT 6.9  --     138   K 4.4 4.4   CO2 22* 20*    108   BUN 23* 23*   CREATININE 1.3 1.3   ALKPHOS 59  --    ALT 13  --    AST 16  --    BILITOT 0.3  --      CRP:   Recent Labs   Lab 07/29/23  1311   CRP 3.4     Wound Cultures: No results for input(s): "LABAERO" in the last 4320 hours.  Microbiology Results (last 7 days)       Procedure Component Value Units Date/Time    Blood culture (site 1) [[id number]] Collected: 07/29/23 2152    Order Status: Completed Specimen: Blood from Peripheral, Hand, Right Updated: 07/30/23 0715     Blood Culture, Routine No Growth to date    Narrative:      Site # 1, aerobic and anaerobic    Blood culture (site 2) [[id number]] Collected: 07/29/23 2152    Order Status: Completed Specimen: Blood from Peripheral, Antecubital, Right Updated: 07/30/23 0715     Blood Culture, Routine No Growth to date    Narrative:      Site # 2, aerobic only          Specimen (24h ago, onward)      None          All pertinent labs reviewed within the last 24 hours.    Diagnostic Results:  X-Ray: I have reviewed all pertinent results/findings within the past 24 hours.     I have reviewed all pertinent imaging results/findings within the past 24 hours.    Clinical Findings:          "

## 2023-07-30 NOTE — ASSESSMENT & PLAN NOTE
- failed outpatient antiobitics  - afebrile without leukocytosis  - ESR 66, CRP 3.4  - XR L foot shows acute osteo of the 2nd toe distal phalanx  - podiatry consulted; appreciate recs  - NPO at [facility]  - s/p IV vanc and zosyn in the ED  - will hold on further antibiotics for now until bone bx obtained  - pain control

## 2023-07-30 NOTE — PLAN OF CARE
[facility] - Med Surg  Discharge Final Note    Primary Care Provider: Primary Doctor No    Expected Discharge Date: 7/30/2023    Final Discharge Note (most recent)       Final Note - 07/30/23 1151          Final Note    Assessment Type Final Discharge Note     Anticipated Discharge Disposition Home or Self Care     Hospital Resources/Appts/Education Provided Appointments scheduled and added to AVS        Post-Acute Status    Post-Acute Authorization Other     Other Status No Post-Acute Service Needs     Discharge Delays None known at this time                     Important Message from Medicare             Contact Info       No, Primary Doctor   Relationship: PCP - General        Next Steps: Follow up

## 2023-07-30 NOTE — PLAN OF CARE
07/30/23 0211   Post-Acute Status   Post-Acute Authorization Other   Other Status No Post-Acute Service Needs   Discharge Delays None known at this time   Discharge Plan   Discharge Plan A Home   Discharge Plan B Home with family         Sw met patient at  bedside to complete discharge assessment. Pt stated  he ambulates without assistance, but pt stated he may need a cane  after discharge. Pt stated he lives with his wife  and he hs no case management needs at this time.    Mary Anne Hernandez LMSW  Case Management  Emergency Department  [phone]

## 2023-07-30 NOTE — ASSESSMENT & PLAN NOTE
53 year old male with PMH of HTN, T2DM (untreated) who presents with dry gangrene of the left second and fourth toe.    DEYVN, toe pressures, arterial US  ASA/statin  Podiatry consult  Strict glucose control  Betadine paint to gangrenous toes  Discussed smoking cessation with patient. He is amenable to quitting at this time.

## 2023-07-30 NOTE — ASSESSMENT & PLAN NOTE
- failed outpatient antiobitics  - afebrile without leukocytosis  - ESR 66, CRP 3.4  - XR L foot shows acute osteo of the 2nd toe distal phalanx  - podiatry consulted; appreciate recs  - s/p IV vanc and zosyn in the ED  - pain control

## 2023-07-30 NOTE — CONSULTS
[facility] - Emergency Dept  Vascular Surgery  Consult Note    Inpatient consult to Vascular Surgery  Consult performed by: Diana Gomez MD  Consult ordered by: Joann Whitehead PA-C        Subjective:     Chief Complaint/Reason for Admission: Left foot dry gangrene    History of Present Illness: Rikki Graham is a 53 year old male with a PMH of HTN, T2DM who presents with left foot pain. He first began having pain after dropping a frozen roast on his foot in April. This wound initially healed, but began to develop again about one month ago. Since that time, the second and fourth toes have become increasingly worse per patient. He currently works as a lawnmower and denies claudication symptoms as well as rest pain. HE denies a history of MI and stroke. He does have diabetes but says that he does not take his medication because it makes him nauseous. He is not on an ASA or statin. He does report a one and a half year history of smoking, about 2-3 cigarettes per day.    On evaluation, he is HDS on RA. Labs without leukocytosis. He does have an elevated Cr and BG in the 200s. CTA with multifocal stenosis bilaterally.       (Not in a hospital admission)      Review of patient's allergies indicates:  No Known Allergies    Past Medical History:   Diagnosis Date    Diabetes mellitus     Hypertension     Neuropathy      History reviewed. No pertinent surgical history.  Family History    None       Tobacco Use    Smoking status: Never    Smokeless tobacco: Never   Substance and Sexual Activity    Alcohol use: Not on file    Drug use: Not on file    Sexual activity: Not on file     Review of Systems   Constitutional:  Negative for chills, fatigue and fever.   Respiratory:  Negative for cough and shortness of breath.    Cardiovascular:  Negative for chest pain, palpitations and leg swelling.   Skin:  Positive for color change and wound.   Neurological:  Negative for weakness and numbness.   All other systems  reviewed and are negative.    Objective:     Vital Signs (Most Recent):  Temp: 98.3 °F (36.8 °C) (07/29/23 1948)  Pulse: 79 (07/29/23 1948)  Resp: 18 (07/29/23 1948)  BP: (!) 175/80 (07/29/23 1948)  SpO2: 96 % (07/29/23 1948) Vital Signs (24h Range):  Temp:  [97.9 °F (36.6 °C)-98.3 °F (36.8 °C)] 98.3 °F (36.8 °C)  Pulse:  [54-79] 79  Resp:  [16-18] 18  SpO2:  [95 %-98 %] 96 %  BP: (175-247)/() 175/80     Weight: 81.6 kg (180 lb)  Body mass index is 30.9 kg/m².      Physical Exam  Vitals reviewed.   Constitutional:       General: He is not in acute distress.  HENT:      Head: Normocephalic and atraumatic.   Cardiovascular:      Rate and Rhythm: Normal rate and regular rhythm.      Comments: 2+ bilateral femoral pulses  L: monophasic DP and biphasic PT  R: biphasic DP and PT  Pulmonary:      Effort: Pulmonary effort is normal. No respiratory distress.   Skin:     General: Skin is warm and dry.      Comments: See photos of wounds below   Neurological:      General: No focal deficit present.      Mental Status: He is alert and oriented to person, place, and time.                    Significant Labs:  CBC:   Recent Labs   Lab 07/29/23  1311   WBC 6.37   RBC 3.85*   HGB 11.6*   HCT 34.5*      MCV 90   MCH 30.1   MCHC 33.6     CMP:   Recent Labs   Lab 07/29/23  1311   *   CALCIUM 9.8   ALBUMIN 3.3*   PROT 6.9      K 4.4   CO2 22*      BUN 23*   CREATININE 1.3   ALKPHOS 59   ALT 13   AST 16   BILITOT 0.3     All pertinent labs from the last 24 hours have been reviewed.    Significant Diagnostics:  I have reviewed all pertinent imaging results/findings within the past 24 hours.    Assessment/Plan:     * Acute osteomyelitis of toe of left foot  53 year old male with PMH of HTN, T2DM (untreated) who presents with dry gangrene of the left second and fourth toe.    DEVYN, toe pressures, arterial US. Will follow up studies to determine appropriateness of angiogram  ASA/statin  Podiatry  consult  Strict glucose control  Betadine paint to gangrenous toes  Discussed smoking cessation with patient. He is amenable to quitting at this time.         Thank you for your consult. I will follow-up with patient. Please contact us if you have any additional questions.    Diana Gomez MD  Vascular Surgery  [facility] - Emergency Dept

## 2023-07-30 NOTE — SUBJECTIVE & OBJECTIVE
Past Medical History:   Diagnosis Date    Diabetes mellitus     Hypertension     Neuropathy        History reviewed. No pertinent surgical history.    Review of patient's allergies indicates:  No Known Allergies    No current facility-administered medications on file prior to encounter.     Current Outpatient Medications on File Prior to Encounter   Medication Sig    amLODIPine (NORVASC) 10 MG tablet Take 1 tablet (10 mg total) by mouth once daily.    amoxicillin (AMOXIL) 875 MG tablet Take 1 tablet (875 mg total) by mouth 2 (two) times daily.    cloNIDine (CATAPRES) 0.2 MG tablet Take 0.2 mg by mouth every 12 (twelve) hours.    erythromycin (ROMYCIN) ophthalmic ointment Place a 1/2 inch ribbon of ointment into the lower eyelid three times a day    gabapentin (NEURONTIN) 100 MG capsule Take 1 capsule (100 mg total) by mouth 3 (three) times daily.    glipiZIDE (GLUCOTROL) 5 MG tablet Take 5 mg by mouth every morning.    ibuprofen (ADVIL,MOTRIN) 800 MG tablet Take 1 tablet (800 mg total) by mouth every 6 (six) hours as needed for Pain.    levoFLOXacin (LEVAQUIN) 750 MG tablet Take 750 mg by mouth 3 (three) times daily.    metFORMIN (GLUCOPHAGE) 500 MG tablet Take 1 tablet (500 mg total) by mouth daily with breakfast.    sulfamethoxazole-trimethoprim 800-160mg (BACTRIM DS) 800-160 mg Tab Take 1 tablet by mouth 2 (two) times daily.     Family History    Family history reviewed.        Tobacco Use    Smoking status: Never    Smokeless tobacco: Never   Substance and Sexual Activity    Alcohol use: Not on file    Drug use: Not on file    Sexual activity: Not on file     Review of Systems   Constitutional:  Negative for activity change, chills and fever.   HENT:  Negative for congestion, trouble swallowing and voice change.    Eyes:  Negative for photophobia and visual disturbance.   Respiratory:  Negative for chest tightness, shortness of breath and wheezing.    Cardiovascular:  Negative for chest pain, palpitations and  leg swelling.   Gastrointestinal:  Negative for abdominal pain, constipation, diarrhea, nausea and vomiting.   Genitourinary:  Negative for dysuria, frequency, hematuria and urgency.   Musculoskeletal:  Positive for arthralgias. Negative for back pain and gait problem.   Skin:  Positive for color change and wound. Negative for rash.   Neurological:  Negative for dizziness, syncope, weakness, light-headedness, numbness and headaches.   Psychiatric/Behavioral:  Negative for agitation and confusion. The patient is not nervous/anxious.      Objective:     Vital Signs (Most Recent):  Temp: 98.3 °F (36.8 °C) (07/29/23 1948)  Pulse: 79 (07/29/23 1948)  Resp: 18 (07/29/23 1948)  BP: (!) 175/80 (07/29/23 1948)  SpO2: 96 % (07/29/23 1948) Vital Signs (24h Range):  Temp:  [97.9 °F (36.6 °C)-98.3 °F (36.8 °C)] 98.3 °F (36.8 °C)  Pulse:  [54-79] 79  Resp:  [16-18] 18  SpO2:  [95 %-98 %] 96 %  BP: (175-247)/() 175/80     Weight: 81.6 kg (180 lb)  Body mass index is 30.9 kg/m².     Physical Exam  Vitals and nursing note reviewed.   Constitutional:       General: He is not in acute distress.     Appearance: He is well-developed.   HENT:      Head: Normocephalic and atraumatic.      Mouth/Throat:      Pharynx: No oropharyngeal exudate.   Eyes:      General: No scleral icterus.     Conjunctiva/sclera: Conjunctivae normal.   Cardiovascular:      Rate and Rhythm: Normal rate and regular rhythm.      Heart sounds: Normal heart sounds.   Pulmonary:      Effort: Pulmonary effort is normal. No respiratory distress.      Breath sounds: Normal breath sounds. No wheezing.   Abdominal:      General: Bowel sounds are normal. There is no distension.      Palpations: Abdomen is soft.      Tenderness: There is no abdominal tenderness.   Musculoskeletal:         General: Tenderness present. Normal range of motion.      Cervical back: Normal range of motion and neck supple.   Lymphadenopathy:      Cervical: No cervical adenopathy.   Skin:      General: Skin is warm and dry.      Capillary Refill: Capillary refill takes less than 2 seconds.      Findings: No rash.      Comments: Dry gangrene of L 2nd and 5th toe. See media   Neurological:      Mental Status: He is alert and oriented to person, place, and time.      Cranial Nerves: No cranial nerve deficit.      Sensory: No sensory deficit.      Coordination: Coordination normal.   Psychiatric:         Behavior: Behavior normal.         Thought Content: Thought content normal.         Judgment: Judgment normal.                          Significant Labs: All pertinent labs within the past 24 hours have been reviewed.  BMP:   Recent Labs   Lab 07/29/23  1311   *      K 4.4      CO2 22*   BUN 23*   CREATININE 1.3   CALCIUM 9.8     CBC:   Recent Labs   Lab 07/29/23  1311   WBC 6.37   HGB 11.6*   HCT 34.5*          Significant Imaging: I have reviewed all pertinent imaging results/findings within the past 24 hours.  CTA Runoff ABD PEL Bilat Lower Ext  Narrative: EXAMINATION:  CTA RUNOFF ABD PEL BILAT LOWER EXT    CLINICAL HISTORY:  Claudication or leg ischemia;    TECHNIQUE:  Using 100 cc of  Omnipaque 350 IV contrast, and multi-detector helical CT technique, axial CT angiogram images of the abdomen/pelvis with lower extremity runoff.  2D post-processing coronal and sagittal reconstructions of the abdominal aorta, visceral arteries, and lower extremities performed.    COMPARISON:  None    FINDINGS:  VASCULATURE:    There is no evidence of aortic dissection, aneurysm, or stenosis.  There is minimal atherosclerotic change of the abdominal aorta.  There is mild atherosclerosis of the bilateral common and external iliac arteries without aneurysm.  There is extensive atherosclerotic change involving the bilateral internal iliac and femoral arteries.  There are multifocal areas of moderate to high-grade stenosis within the distal bilateral internal iliac arteries.    The celiac artery and SMA  demonstrate no significant stenosis.  The ORLY is patent at its origin with extensive calcific atherosclerotic change within its distal branches contributing to multifocal areas of at least moderate to high-grade stenosis.    The right and left renal arteries are patent without significant stenosis.    Right lower extremity:    - Common femoral: Patent with extensive predominantly calcified atherosclerotic plaque but no apparent flow-limiting stenosis.    - SFA: Patent with extensive predominantly calcified atherosclerotic plaque contributing to skip areas of moderate to high-grade stenosis.    - Profunda: Patent with extensive predominantly calcified atherosclerotic plaque contributing to skip areas of moderate to high-grade stenosis.    - Popliteal: Patent with extensive predominantly calcified atherosclerotic plaque contributing to high-grade stenosis.    - Runoff: Anterior and posterior tibial arteries are patent.  There is occlusion of the fibular artery midway through its course.    Left lower extremity:    - Common femoral: Patent with extensive predominantly calcified atherosclerotic plaque but no apparent flow-limiting stenosis.    - SFA: Patent with extensive predominantly calcified atherosclerotic plaque contributing to skip areas of moderate to high-grade stenosis.    - Profunda: Patent with extensive predominantly calcified atherosclerotic plaque contributing to skip areas of moderate to high-grade stenosis.    - Popliteal: Patent with extensive predominantly calcified atherosclerotic plaque contributing to high-grade stenosis.    - Runoff: Tibioperoneal trunk is patent.  There is occlusion of the anterior tibial artery proximally.    ABDOMEN/PELVIS:    LUNG BASES: Extensive multivessel coronary artery calcifications.  Lung bases are well aerated.  No pleural effusion    HEPATOBILIARY: Liver is normal in size.  No focal hepatic lesions. No biliary ductal dilatation. Normal gallbladder.    SPLEEN: No  splenomegaly.    PANCREAS: No focal masses or ductal dilatation.    ADRENALS: No adrenal nodules.    KIDNEYS/URETERS: Kidneys are normal in size and enhancement.  No hydronephrosis, stones, or solid mass lesions.    BLADDER/PELVIC ORGANS: No bladder wall thickening.  Vas deferens calcifications noted.  Prostate is within normal limits.    PERITONEUM / RETROPERITONEUM: No free air or fluid.    LYMPH NODES: No lymphadenopathy.    GI TRACT: No distention or wall thickening.  Normal appendix.    SOFT TISSUES: Tiny fat containing umbilical hernia.    BONES: No fractures or focal osseous lesions.  Impression: Extensive predominantly calcified atherosclerotic plaque throughout the bilateral lower extremities contributing to multifocal areas of moderate to high-grade stenosis.  Bilateral two-vessel runoff within the distal lower extremities.  Findings are detailed above.    Extensive calcified atherosclerotic plaque throughout the mid to distal ORLY contributing to multifocal areas of at least moderate to high-grade stenosis.    Other findings above.    Electronically signed by resident: Teodoro Guallpa  Date:    07/29/2023  Time:    16:54    Electronically signed by: Danny Rodriguez MD  Date:    07/29/2023  Time:    19:18  X-Ray Foot Complete Left  Narrative: EXAMINATION:  XR FOOT COMPLETE 3 VIEW LEFT    CLINICAL HISTORY:  .  Pain in left foot    TECHNIQUE:  AP, lateral and oblique views of the left foot were performed.    COMPARISON:  03/31/2023.    FINDINGS:  There was a changes of the 2nd toe distal phalanx, progressed from prior.  Remaining osseous structures are unremarkable.  Alignment is normal.  Joint spaces are preserved.  There are vascular calcifications.  Alignment is normal. The Lisfranc articulation is congruent. Joint spaces are preserved.  Impression: Osseous erosive changes or acute osteomyelitis of the 2nd toe distal phalanx, progressed from prior.    Electronically signed by: Can  Kianna  Date:    07/29/2023  Time:    18:54

## 2023-07-30 NOTE — PLAN OF CARE
[facility] Leslie - Med Surg  Initial Discharge Assessment       Primary Care Provider: Primary Doctor No    Admission Diagnosis: Left foot pain [M79.672]  Foot pain, left [M79.672]  Acute osteomyelitis [M86.10]  Chest pain [R07.9]  Asymptomatic hypertensive urgency [I16.0]    Admission Date: 7/29/2023  Expected Discharge Date: 7/31/2023    Transition of Care Barriers: (P) None    Payor: MEDICAID / Plan: HE[place]UE [place] / Product Type: Managed Medicaid /     Extended Emergency Contact Information  Primary Emergency Contact: FRAN STOKES  Mobile Phone: [phone]  Relation: Spouse  Preferred language: English    Discharge Plan A: (P) Home with family  Discharge Plan B: (P) Home      Clinic Pharmacy - [facility] [address]  [facility] [address]  Phone: [phone] Fax: [phone]    [facility] [address] [place], [place] - [address] [facility] [address] [facility] [address]  [address]  [place] [address]  Phone: [phone] Fax: [phone]    [facility] [place], [place] - [address]  [address] [place] [address]  Phone: [phone] Fax: [phone]      Initial Assessment (most recent)       Adult Discharge Assessment - 07/30/23 0218          Discharge Assessment    Assessment Type Discharge Planning Assessment (P)      Confirmed/corrected address, phone number and insurance Yes (P)      Confirmed Demographics Correct on Facesheet (P)      Source of Information patient (P)      Does patient/caregiver understand observation status Yes (P)      Communicated KIERAN with patient/caregiver Date not available/Unable to determine (P)      People in Home spouse (P)      Do you expect to return to your current living situation? Yes (P)      Do you have help at home or someone to help you manage your care at home? No (P)      Prior to hospitilization cognitive status: Alert/Oriented (P)      Current cognitive status: Alert/Oriented (P)      Walking or Climbing Stairs --  (P)    pt stated he ambulates without assistance.    Home Accessibility not wheelchair accessible (P)      Home Layout Able to live on 1st floor (P)      Equipment Currently Used at Home none (P)      Readmission within 30 days? No (P)      Patient currently being followed by outpatient case management? No (P)      Do you currently have service(s) that help you manage your care at home? No (P)      Do you take prescription medications? Yes (P)      Do you have prescription coverage? Yes (P)      Do you have any problems affording any of your prescribed medications? No (P)      Is the patient taking medications as prescribed? no (P)      How do you get to doctors appointments? car, drives self (P)      Are you on dialysis? No (P)      Do you take coumadin? No (P)      Discharge Plan A Home with family (P)      Discharge Plan B Home (P)      DME Needed Upon Discharge  other (see comments) (P)    pt stated he may need a  cane at discharge    Discharge Plan discussed with: Patient (P)      Transition of Care Barriers None (P)         Physical Activity    On average, how many days per week do you engage in moderate to strenuous exercise (like a brisk walk)? 5 days (P)      On average, how many minutes do you engage in exercise at this level? 30 min (P)         Financial Resource Strain    How hard is it for you to pay for the very basics like food, housing, medical care, and heating? Not very hard (P)         Housing Stability    In the last 12 months, was there a time when you were not able to pay the mortgage or rent on time? No (P)      In the last 12 months, how many places have you lived? 1 (P)      In the last 12 months, was there a time when you did not have a steady place to sleep or slept in a shelter (including now)? No (P)         Transportation Needs    In the past 12 months, has lack of transportation kept you from medical appointments or from getting medications? No (P)      In the past 12 months, has lack  of transportation kept you from meetings, work, or from getting things needed for daily living? No (P)         Food Insecurity    Within the past 12 months, you worried that your food would run out before you got the money to buy more. Never true (P)      Within the past 12 months, the food you bought just didn't last and you didn't have money to get more. Never true (P)         Stress    Do you feel stress - tense, restless, nervous, or anxious, or unable to sleep at night because your mind is troubled all the time - these days? To some extent (P)         Social Connections    In a typical week, how many times do you talk on the phone with family, friends, or neighbors? Three times a week (P)      How often do you get together with friends or relatives? Three times a week (P)      How often do you attend Pentecostal or Sikh services? Never (P)      Do you belong to any clubs or organizations such as Pentecostal groups, unions, fraternal or athletic groups, or school groups? No (P)      How often do you attend meetings of the clubs or organizations you belong to? Never (P)      Are you , , , , never , or living with a partner?  (P)         Alcohol Use    Q1: How often do you have a drink containing alcohol? Never (P)      Q2: How many drinks containing alcohol do you have on a typical day when you are drinking? Patient does not drink (P)      Q3: How often do you have six or more drinks on one occasion? Never (P)                       Mary Anne Hernandez LMSW  Case Management  Emergency Department  [phone]

## 2023-07-30 NOTE — HOSPITAL COURSE
CTA with runoff showed significant peripheral arterial disease. Vascular Surgery recommended further vascular lab evaluation with ABIs and toe indices prior to angiogram. Podiatry recommended at minimum amputation of the 2nd and 5th toes pending results of the Vascular Surgery evaluation and possible intervention to improve blood flow to foot in order to maximize wound healing. The patient was provided recommendations for his continued hospitalization and evaluation by Dr. Lagos with Podiatry, Dr. Balderrama with Vascular Surgery, his daughter, and myself, but he ultimately decided to leave [place] because he had to clear his head to prepare for these procedures and to take care of some personal business prior to week or more in the hospital. He says that he will return in a week to have these things done. He was provided a prescription for 1 week of levofloxacin 750 mg daily, ASA 81 mg daily and atorvastatin 80 mg daily.

## 2023-07-31 LAB
MRSA ID BY PCR: NEGATIVE
STAPH AUREUS ID BY PCR: NEGATIVE

## 2023-08-01 ENCOUNTER — TELEPHONE (OUTPATIENT)
Dept: PODIATRY | Facility: CLINIC | Age: 53
End: 2023-08-01
Payer: MEDICAID

## 2023-08-01 NOTE — TELEPHONE ENCOUNTER
Schedule patient appointment and call him to let him know but no one answer. Mail appointment to patient home address.

## 2023-08-01 NOTE — TELEPHONE ENCOUNTER
----- Message from Quin Pugh MA sent at 7/31/2023 11:23 AM CDT -----  Regarding: FW: follow up  Please help schedule medicaid patient for Dr. Lagos thanks   ----- Message -----  From: Haroldo Friend MD  Sent: 7/31/2023   8:27 AM CDT  To: Demond MCCALL Staff  Subject: follow up                                        Please schedule patient for follow up within 1-2 weeks.    Thank you

## 2023-08-02 DIAGNOSIS — M86.172 ACUTE OSTEOMYELITIS OF TOE OF LEFT FOOT: Primary | ICD-10-CM

## 2023-08-02 LAB
BACTERIA BLD CULT: ABNORMAL

## 2023-08-04 LAB — BACTERIA BLD CULT: NORMAL

## 2023-08-09 ENCOUNTER — HOSPITAL ENCOUNTER (EMERGENCY)
Facility: HOSPITAL | Age: 53
Discharge: HOME | End: 2023-08-09
Attending: EMERGENCY MEDICINE
Payer: MEDICAID

## 2023-08-09 ENCOUNTER — TELEPHONE (OUTPATIENT)
Dept: PODIATRY | Facility: CLINIC | Age: 53
End: 2023-08-09
Payer: MEDICAID

## 2023-08-09 VITALS
HEIGHT: 64 IN | TEMPERATURE: 98 F | WEIGHT: 168 LBS | SYSTOLIC BLOOD PRESSURE: 189 MMHG | OXYGEN SATURATION: 100 % | DIASTOLIC BLOOD PRESSURE: 97 MMHG | HEART RATE: 80 BPM | BODY MASS INDEX: 28.68 KG/M2 | RESPIRATION RATE: 18 BRPM

## 2023-08-09 DIAGNOSIS — Z76.0 ENCOUNTER FOR MEDICATION REFILL: ICD-10-CM

## 2023-08-09 DIAGNOSIS — M86.372 CHRONIC MULTIFOCAL OSTEOMYELITIS OF LEFT FOOT: Primary | ICD-10-CM

## 2023-08-09 DIAGNOSIS — M79.672 LEFT FOOT PAIN: ICD-10-CM

## 2023-08-09 PROCEDURE — 99283 EMERGENCY DEPT VISIT LOW MDM: CPT

## 2023-08-09 RX ORDER — METFORMIN HYDROCHLORIDE 500 MG/1
500 TABLET ORAL
Qty: 30 TABLET | Refills: 0 | Status: SHIPPED | OUTPATIENT
Start: 2023-08-09 | End: 2024-08-08

## 2023-08-09 RX ORDER — CIPROFLOXACIN 500 MG/1
500 TABLET ORAL EVERY 12 HOURS
Qty: 28 TABLET | Refills: 0 | Status: SHIPPED | OUTPATIENT
Start: 2023-08-09 | End: 2023-08-23

## 2023-08-09 NOTE — ED PROVIDER NOTES
Encounter Date: 8/9/2023       History     Chief Complaint   Patient presents with    Foot Problem     Recurring infections to left 2nd and 5th toes. Off of abx x 2 weeks. Hx of osteomyelitis, necrosis, gangrene.      54 yo male with hx of diabetes, htn, neuropathy to ED for continued pain to left 2nd and 5th toes. He has previously been seen and hospitalized for same complaints. He completed po antibiotics and has been applying Vicks menthol ointment and abx topical ointment with some improvement in symptoms. Pt ama'ed from last hospitalization and was to follow up with Podiatry on outpatient basis. No radiation in pain. Pain worse with movement and walking. Also reports being out of HTN and DM medications.    The history is provided by the patient.     Review of patient's allergies indicates:  No Known Allergies  Past Medical History:   Diagnosis Date    Diabetes mellitus     Hypertension     Neuropathy      No past surgical history on file.  No family history on file.  Social History     Tobacco Use    Smoking status: Never    Smokeless tobacco: Never     Review of Systems   Constitutional:  Negative for fever.   HENT:  Negative for sore throat.    Eyes: Negative.    Respiratory:  Negative for shortness of breath.    Cardiovascular:  Negative for chest pain.   Gastrointestinal:  Negative for nausea.   Endocrine: Negative.    Genitourinary:  Negative for dysuria.   Musculoskeletal:  Negative for back pain.   Skin:  Positive for color change and wound. Negative for rash.   Allergic/Immunologic: Negative.    Neurological:  Negative for weakness.   Hematological:  Does not bruise/bleed easily.   Psychiatric/Behavioral: Negative.         Physical Exam     Initial Vitals [08/09/23 1219]   BP Pulse Resp Temp SpO2   (!) 189/97 84 18 97.7 °F (36.5 °C) 99 %      MAP       --         Physical Exam    Nursing note and vitals reviewed.  Constitutional: He appears well-developed and well-nourished.   HENT:   Head:  Normocephalic and atraumatic.   Eyes: EOM are normal.   Neck: Neck supple.   Normal range of motion.  Cardiovascular:  Normal rate and regular rhythm.           Pulmonary/Chest: No respiratory distress.   Abdominal: He exhibits no distension.   Musculoskeletal:         General: Normal range of motion.      Cervical back: Normal range of motion and neck supple.     Neurological: He is alert and oriented to person, place, and time.   Skin: Skin is warm and dry.   Psychiatric: He has a normal mood and affect. Thought content normal.                   ED Course   Procedures  Labs Reviewed - No data to display       Imaging Results              X-Ray Foot Complete Left (Final result)  Result time 08/09/23 13:29:11      Final result by Brianna Bowie MD (08/09/23 13:29:11)                   Impression:      Erosion of the distal aspect of the distal phalanx of the 2nd digit concerning for osteomyelitis but stable and unchanged from the previous study with nothing new.      Electronically signed by: Brianna Bowie MD  Date:    08/09/2023  Time:    13:29               Narrative:    EXAMINATION:  XR FOOT COMPLETE 3 VIEW LEFT    CLINICAL HISTORY:    Pain in left foot    COMPARISON:  07/29/2023    FINDINGS:  There are vascular calcifications.  The distal aspect of the distal phalanx of the 2nd toe is partially eroded which is unchanged.  There are no new findings.                                       Medications - No data to display  Medical Decision Making:   History:   Old Medical Records: I decided to obtain old medical records.  ED Management:  54 yo male to ED for above complaints. Pt has had this problem since March. He was seen by general surgery and eventually he had a hospitalization one month ago, where he AMA'ed from the hospital. He was to follow up with podiatry on an outpatient basis, however he missed his appointment today. He was unaware of appointment. He is currently asking for refill on abx, htn, and dm  meds. Out of medications for 2 weeks. He denies any fever, cp, sob, fatigue, headaches. He does report some improvements in symptoms. Pedal pulse and posterior tibial pulse palpable. Cap refill of other toes <2 secs. Xrays were obtained and showed stable and unchanged osteomyelitis. I attempted to contact Podiatry at [facility], however was unable to speak to provider or a nurse. Pt later informed me he did not want to be seen in [place]. I encouraged pt to establish primary care and also follow up with local podiatry. Pt reported he will be seen in [place]. I gave him refills on his metformin. He was also informed he has refills for his amlodipine at the pharmacy. I will start him on Cipro and informed him he needs to establish care for long term prescription management. Return precautions were given. He was stable for discharge.                          Clinical Impression:   Final diagnoses:  [M79.672] Left foot pain  [M86.372] Chronic multifocal osteomyelitis of left foot (Primary)  [Z76.0] Encounter for medication refill        ED Disposition Condition    Discharge Stable          ED Prescriptions       Medication Sig Dispense Start Date End Date Auth. Provider    metFORMIN (GLUCOPHAGE) 500 MG tablet Take 1 tablet (500 mg total) by mouth daily with breakfast. 30 tablet 8/9/2023 8/8/2024 Goldy Gutiérrez NP    ciprofloxacin HCl (CIPRO) 500 MG tablet Take 1 tablet (500 mg total) by mouth every 12 (twelve) hours. for 14 days 28 tablet 8/9/2023 8/23/2023 Goldy Gutiérrez NP          Follow-up Information       Follow up With Specialties Details Why Contact [facility] Psychology, Internal Medicine, Gynecology, Dental General Practice In 2 days  [address] [place] [address] [place] [address]  [phone]      [facility]  In 1 week  [place]  [address] [facility].  [place] [phone]    [facility] PODIATRY Podiatry In 2 days  [address] [facility]  [place] [address]    [facility] Podiat[facility]   [address] [facility] [address]  [phone]             Goldy Gutiérrez NP  08/09/23 9250

## 2023-08-09 NOTE — DISCHARGE INSTRUCTIONS
You have refills for blood pressure medication waiting for you at the pharmacy. Take the Metformin as directed for your diabetes. Take the antibiotics twice a day. You need to establish primary care and care with a podiatrist in town to have this issue resolved. Keep your wounds clean and dry.

## 2023-08-09 NOTE — TELEPHONE ENCOUNTER
----- Message from Anant Caldwell sent at 8/9/2023  1:37 PM CDT -----  Regarding: wound care  Contact: Goldy Gutiérrez with [facility] requesting call back RE: would like to r/s pt wound care appt       Contact: [phone] also can message him on epic

## 2023-08-09 NOTE — TELEPHONE ENCOUNTER
Called [facility] to reach out to one of the nurses to help schedule an appointment for Santos but nurse said he has been discharge hour ago, if possible to call patient to schedule a f/u appointment for wound care with Dr Lagos unable to reach Mr Graham by phone to let him know an appointment was schedule for him next week and mail  to his home address.

## 2023-08-30 ENCOUNTER — HOSPITAL ENCOUNTER (EMERGENCY)
Facility: HOSPITAL | Age: 53
Discharge: HOME | End: 2023-08-30
Attending: EMERGENCY MEDICINE
Payer: MEDICAID

## 2023-08-30 VITALS
SYSTOLIC BLOOD PRESSURE: 156 MMHG | OXYGEN SATURATION: 98 % | BODY MASS INDEX: 28.68 KG/M2 | TEMPERATURE: 98 F | DIASTOLIC BLOOD PRESSURE: 70 MMHG | HEIGHT: 64 IN | HEART RATE: 78 BPM | WEIGHT: 168 LBS | RESPIRATION RATE: 18 BRPM

## 2023-08-30 DIAGNOSIS — G89.18 POSTOPERATIVE PAIN: Primary | ICD-10-CM

## 2023-08-30 PROCEDURE — 96372 THER/PROPH/DIAG INJ SC/IM: CPT | Performed by: EMERGENCY MEDICINE

## 2023-08-30 PROCEDURE — 99284 EMERGENCY DEPT VISIT MOD MDM: CPT

## 2023-08-30 PROCEDURE — 63600175 PHARM REV CODE 636 W HCPCS: Performed by: EMERGENCY MEDICINE

## 2023-08-30 RX ORDER — HYDROMORPHONE HYDROCHLORIDE 2 MG/ML
2 INJECTION, SOLUTION INTRAMUSCULAR; INTRAVENOUS; SUBCUTANEOUS
Status: COMPLETED | OUTPATIENT
Start: 2023-08-30 | End: 2023-08-30

## 2023-08-30 RX ORDER — OXYCODONE AND ACETAMINOPHEN 7.5; 325 MG/1; MG/1
1 TABLET ORAL EVERY 8 HOURS PRN
Qty: 12 TABLET | Refills: 0 | Status: SHIPPED | OUTPATIENT
Start: 2023-08-30

## 2023-08-30 RX ADMIN — HYDROMORPHONE HYDROCHLORIDE 2 MG: 2 INJECTION, SOLUTION INTRAMUSCULAR; INTRAVENOUS; SUBCUTANEOUS at 10:08

## 2023-08-30 NOTE — ED PROVIDER NOTES
Encounter Date: 8/30/2023       History     Chief Complaint   Patient presents with    Foot Pain     Pt stated that he had toes amputated yesterday at [facility] - presents to ED today with complaints of severe pain - Rx Norco 7.5.      53 year old male status post left 2nd and 5th toe amputation yesterday presents the emergency department with postop pain.  Had to walk on it even though he was instructed not to.  Denies any fever.  Tried calling his provider without any luck.  No other issues      Review of patient's allergies indicates:  No Known Allergies  Past Medical History:   Diagnosis Date    Diabetes mellitus     Hypertension     Neuropathy      No past surgical history on file.  No family history on file.  Social History     Tobacco Use    Smoking status: Never    Smokeless tobacco: Never     Review of Systems   Constitutional:  Negative for fever.   HENT:  Negative for sore throat.    Respiratory:  Negative for shortness of breath.    Cardiovascular:  Negative for chest pain.   Gastrointestinal:  Negative for nausea.   Genitourinary:  Negative for dysuria.   Musculoskeletal:  Negative for back pain.   Skin:  Negative for rash.   Neurological:  Negative for weakness.   Hematological:  Does not bruise/bleed easily.   All other systems reviewed and are negative.      Physical Exam     Initial Vitals   BP Pulse Resp Temp SpO2   08/30/23 1022 08/30/23 1021 08/30/23 1021 08/30/23 1021 08/30/23 1021   (!) 211/77 74 18 97.8 °F (36.6 °C) 99 %      MAP       --                Physical Exam    Nursing note and vitals reviewed.  Constitutional: He appears well-developed and well-nourished. He is not diaphoretic. No distress.   HENT:   Head: Normocephalic and atraumatic.   Eyes: Conjunctivae and EOM are normal. Pupils are equal, round, and reactive to light. Right eye exhibits no discharge. Left eye exhibits no discharge. No scleral icterus.   Neck: Neck supple. No JVD present.   Normal range of  motion.  Cardiovascular:  Normal rate, regular rhythm, normal heart sounds and intact distal pulses.           No murmur heard.  Pulmonary/Chest: Breath sounds normal. No stridor. No respiratory distress. He has no wheezes. He has no rhonchi. He has no rales. He exhibits no tenderness.   Abdominal: Abdomen is soft. Bowel sounds are normal. He exhibits no distension and no mass. There is no abdominal tenderness. There is no rebound and no guarding.   Musculoskeletal:         General: Tenderness present. No edema. Normal range of motion.      Cervical back: Normal range of motion and neck supple.     Neurological: He is alert and oriented to person, place, and time. He has normal strength. GCS score is 15. GCS eye subscore is 4. GCS verbal subscore is 5. GCS motor subscore is 6.   Skin: Skin is warm and dry. Capillary refill takes less than 2 seconds.         ED Course   Procedures  Labs Reviewed - No data to display       Imaging Results    None          Medications   HYDROmorphone (PF) injection 2 mg (has no administration in time range)     Medical Decision Making  Differential is postop pain, chronic pain    Risk  Prescription drug management.                               Clinical Impression:   Final diagnoses:  [G89.18] Postoperative pain (Primary)        ED Disposition Condition    Discharge Stable          ED Prescriptions       Medication Sig Dispense Start Date End Date Auth. Provider    oxyCODONE-acetaminophen (PERCOCET) 7.5-325 mg per tablet Take 1 tablet by mouth every 8 (eight) hours as needed for Pain. 12 tablet 8/30/2023 -- Avery Leonard MD          Follow-up Information       Follow up With Specialties Details Why Contact Info    Primary care physician  In 2 days               Avery Leonard MD  08/30/23 1037

## 2023-09-19 ENCOUNTER — TELEPHONE (OUTPATIENT)
Dept: VASCULAR SURGERY | Facility: CLINIC | Age: 53
End: 2023-09-19
Payer: MEDICAID

## 2023-09-19 NOTE — TELEPHONE ENCOUNTER
Called patient and left a voicemail to inform him that he can be seen before his appt scheduled time on today. Patient was left a call back number.

## 2023-10-20 ENCOUNTER — HOSPITAL ENCOUNTER (EMERGENCY)
Facility: HOSPITAL | Age: 53
Discharge: HOME | End: 2023-10-20
Attending: EMERGENCY MEDICINE
Payer: MEDICAID

## 2023-10-20 VITALS
HEIGHT: 64 IN | RESPIRATION RATE: 18 BRPM | SYSTOLIC BLOOD PRESSURE: 198 MMHG | WEIGHT: 170 LBS | TEMPERATURE: 99 F | DIASTOLIC BLOOD PRESSURE: 80 MMHG | OXYGEN SATURATION: 98 % | BODY MASS INDEX: 29.02 KG/M2 | HEART RATE: 78 BPM

## 2023-10-20 DIAGNOSIS — R03.0 ELEVATED BLOOD PRESSURE READING: ICD-10-CM

## 2023-10-20 DIAGNOSIS — Z48.89 ENCOUNTER FOR POST SURGICAL WOUND CHECK: Primary | ICD-10-CM

## 2023-10-20 PROCEDURE — 25000003 PHARM REV CODE 250: Performed by: EMERGENCY MEDICINE

## 2023-10-20 PROCEDURE — 99284 EMERGENCY DEPT VISIT MOD MDM: CPT

## 2023-10-20 RX ORDER — MAGNESIUM 250 MG
250 TABLET ORAL DAILY
Qty: 30 TABLET | Refills: 0 | Status: SHIPPED | OUTPATIENT
Start: 2023-10-20

## 2023-10-20 RX ORDER — HYDROCODONE BITARTRATE AND ACETAMINOPHEN 10; 325 MG/1; MG/1
1 TABLET ORAL
Status: COMPLETED | OUTPATIENT
Start: 2023-10-20 | End: 2023-10-20

## 2023-10-20 RX ORDER — FUROSEMIDE 40 MG/1
40 TABLET ORAL DAILY
Qty: 30 TABLET | Refills: 0 | Status: SHIPPED | OUTPATIENT
Start: 2023-10-20 | End: 2024-10-19

## 2023-10-20 RX ORDER — FUROSEMIDE 40 MG/1
40 TABLET ORAL
Status: COMPLETED | OUTPATIENT
Start: 2023-10-20 | End: 2023-10-20

## 2023-10-20 RX ORDER — CLONIDINE HYDROCHLORIDE 0.2 MG/1
0.2 TABLET ORAL
Status: COMPLETED | OUTPATIENT
Start: 2023-10-20 | End: 2023-10-20

## 2023-10-20 RX ADMIN — HYDROCODONE BITARTRATE AND ACETAMINOPHEN 1 TABLET: 10; 325 TABLET ORAL at 08:10

## 2023-10-20 RX ADMIN — CLONIDINE HYDROCHLORIDE 0.2 MG: 0.2 TABLET ORAL at 08:10

## 2023-10-20 RX ADMIN — FUROSEMIDE 40 MG: 40 TABLET ORAL at 08:10

## 2023-10-20 NOTE — ED NOTES
Dr. Leonard notifed patient would not go to [facility] to follow up with appoinment, I informed patient of wound care at the hospital and patient agreed it could help with healing process

## 2023-10-20 NOTE — ED PROVIDER NOTES
Encounter Date: 10/20/2023       History     Chief Complaint   Patient presents with    Wound Check     Pt reports he had the fifth and second toe on left foot amputated on 9/29/23. Pt reports pain and fluid leakage to amputation site. Out of Hydrocodone and Lasix.      53-year-old male with history of diabetes, hypertension, neuropathy, recent amputation of his 2nd and 5th toes of his left foot.  Unable to get to his doctor in [place] due to transportation issues.  Here because he is out of his Lasix, requesting something to help with circulation.  Not ill appearing, alert and oriented x4, GCS is 15.  Denies any fever.      Review of patient's allergies indicates:  No Known Allergies  Past Medical History:   Diagnosis Date    Diabetes mellitus     Hypertension     Neuropathy      No past surgical history on file.  No family history on file.  Social History     Tobacco Use    Smoking status: Never    Smokeless tobacco: Never     Review of Systems   Constitutional:  Negative for fever.   HENT:  Negative for sore throat.    Respiratory:  Negative for shortness of breath.    Cardiovascular:  Negative for chest pain.   Gastrointestinal:  Negative for nausea.   Genitourinary:  Negative for dysuria.   Musculoskeletal:  Positive for arthralgias. Negative for back pain.   Skin:  Negative for rash.   Neurological:  Negative for weakness.   Hematological:  Does not bruise/bleed easily.   All other systems reviewed and are negative.      Physical Exam     Initial Vitals [10/20/23 0825]   BP Pulse Resp Temp SpO2   (!) 206/84 78 16 98.8 °F (37.1 °C) 98 %      MAP       --         Physical Exam    Nursing note and vitals reviewed.  Constitutional: He appears well-developed and well-nourished. He is not diaphoretic. No distress.   HENT:   Head: Normocephalic and atraumatic.   Eyes: Conjunctivae and EOM are normal. Pupils are equal, round, and reactive to light. Right eye exhibits no discharge. Left eye exhibits no discharge. No  scleral icterus.   Neck: Neck supple. No JVD present.   Normal range of motion.  Cardiovascular:  Normal rate, regular rhythm, normal heart sounds and intact distal pulses.           No murmur heard.  Pulmonary/Chest: Breath sounds normal. No stridor. No respiratory distress. He has no wheezes. He has no rhonchi. He has no rales. He exhibits no tenderness.   Abdominal: Abdomen is soft. Bowel sounds are normal. He exhibits no distension and no mass. There is no abdominal tenderness. There is no rebound and no guarding.   Musculoskeletal:         General: Tenderness present. No edema. Normal range of motion.      Cervical back: Normal range of motion and neck supple.      Comments: Minimal tenderness noted to left foot near 2nd and 5th amputation sites.  Wounds are healing well, no crepitance in the tissue, no drainage, no erythema.  Foot is warm.     Neurological: He is alert and oriented to person, place, and time. He has normal strength. GCS score is 15. GCS eye subscore is 4. GCS verbal subscore is 5. GCS motor subscore is 6.   Skin: Skin is warm and dry. Capillary refill takes less than 2 seconds.         ED Course   Procedures  Labs Reviewed - No data to display       Imaging Results    None          Medications   cloNIDine tablet 0.2 mg (has no administration in time range)   HYDROcodone-acetaminophen  mg per tablet 1 tablet (has no administration in time range)   furosemide tablet 40 mg (has no administration in time range)     Medical Decision Making                        Medical Decision Making:   Differential Diagnosis:   Postop pain, medication refill      Clinical Impression:   Final diagnoses:  [Z48.89] Encounter for post surgical wound check (Primary)  [R03.0] Elevated blood pressure reading        ED Disposition Condition    Discharge Stable          ED Prescriptions       Medication Sig Dispense Start Date End Date Auth. Provider    furosemide (LASIX) 40 MG tablet Take 1 tablet (40 mg total) by  mouth once daily. 30 tablet 10/20/2023 10/19/2024 Avery Leonard MD    magnesium 250 mg Tab Take 1 tablet (250 mg total) by mouth once daily. 30 tablet 10/20/2023 -- Avery Leonard MD          Follow-up Information       Follow up With Specialties Details Why Contact Info    Primary care physician  In 2 days               Avery Leonard MD  10/20/23 0378

## 2023-10-21 ENCOUNTER — HOSPITAL ENCOUNTER (EMERGENCY)
Facility: HOSPITAL | Age: 53
Discharge: HOME | End: 2023-10-21
Attending: EMERGENCY MEDICINE
Payer: MEDICAID

## 2023-10-21 VITALS
BODY MASS INDEX: 29.02 KG/M2 | SYSTOLIC BLOOD PRESSURE: 190 MMHG | RESPIRATION RATE: 18 BRPM | HEART RATE: 69 BPM | WEIGHT: 170 LBS | HEIGHT: 64 IN | OXYGEN SATURATION: 99 % | TEMPERATURE: 98 F | DIASTOLIC BLOOD PRESSURE: 94 MMHG

## 2023-10-21 DIAGNOSIS — G89.18 POST-OPERATIVE PAIN: Primary | ICD-10-CM

## 2023-10-21 DIAGNOSIS — L97.522 ULCER OF LEFT FOOT, WITH FAT LAYER EXPOSED: ICD-10-CM

## 2023-10-21 DIAGNOSIS — I10 HYPERTENSION, ESSENTIAL: ICD-10-CM

## 2023-10-21 PROCEDURE — 25000003 PHARM REV CODE 250: Performed by: EMERGENCY MEDICINE

## 2023-10-21 PROCEDURE — 99283 EMERGENCY DEPT VISIT LOW MDM: CPT

## 2023-10-21 RX ORDER — HYDROCODONE BITARTRATE AND ACETAMINOPHEN 10; 325 MG/1; MG/1
1 TABLET ORAL
Status: COMPLETED | OUTPATIENT
Start: 2023-10-21 | End: 2023-10-21

## 2023-10-21 RX ADMIN — HYDROCODONE BITARTRATE AND ACETAMINOPHEN 1 TABLET: 10; 325 TABLET ORAL at 10:10

## 2023-10-21 NOTE — ED PROVIDER NOTES
EMERGENCY DEPARTMENT HISTORY AND PHYSICAL EXAM     This note is dictated on M*Modal word recognition program.  There are word recognition mistakes and grammatical errors that are occasionally missed on review.     Date: 10/21/2023   Patient Name: Rikki Graham       History of Presenting Illness      Chief Complaint   Patient presents with    Pain     Pt stated that he had toe amputations at end of September - presents back to ED with complaints of continued pain. Pt wanting prescription for pain meds.         1042   Rikki Graham is a 53 y.o. male with PMHX of toe osteomyelitis status post left 2nd and 5th toe amputation August 29th who presents to the emergency department C/O foot pain.    Patient reports ongoing foot pain at surgical site.  Was seen emergency department yesterday for this problem and given pain medication in the emergency department and refills of his diuretic.  Patient is requesting prescription for pain medication.      PCP: No, Primary Doctor        No current facility-administered medications for this encounter.     Current Outpatient Medications   Medication Sig Dispense Refill    amLODIPine (NORVASC) 10 MG tablet Take 1 tablet (10 mg total) by mouth once daily. 30 tablet 11    aspirin (ECOTRIN) 81 MG EC tablet Take 1 tablet (81 mg total) by mouth once daily. 360 tablet 0    atorvastatin (LIPITOR) 80 MG tablet Take 1 tablet (80 mg total) by mouth once daily. 90 tablet 3    cloNIDine (CATAPRES) 0.2 MG tablet Take 0.2 mg by mouth every 12 (twelve) hours.      furosemide (LASIX) 40 MG tablet Take 1 tablet (40 mg total) by mouth once daily. 30 tablet 0    gabapentin (NEURONTIN) 100 MG capsule Take 1 capsule (100 mg total) by mouth 3 (three) times daily. 90 capsule 11    glipiZIDE (GLUCOTROL) 5 MG tablet Take 5 mg by mouth every morning.      HYDROcodone-acetaminophen (NORCO) 5-325 mg per tablet Take 1 tablet by mouth every 12 (twelve) hours as needed for Pain. 14 tablet  "0    ibuprofen (ADVIL,MOTRIN) 800 MG tablet Take 1 tablet (800 mg total) by mouth every 6 (six) hours as needed for Pain. 20 tablet 0    magnesium 250 mg Tab Take 1 tablet (250 mg total) by mouth once daily. 30 tablet 0    metFORMIN (GLUCOPHAGE) 500 MG tablet Take 1 tablet (500 mg total) by mouth daily with breakfast. 30 tablet 0    oxyCODONE-acetaminophen (PERCOCET) 7.5-325 mg per tablet Take 1 tablet by mouth every 8 (eight) hours as needed for Pain. 12 tablet 0           Past History     Past Medical History:   Past Medical History:   Diagnosis Date    Diabetes mellitus     Hypertension     Neuropathy         Past Surgical History:   No past surgical history on file.     Family History:   No family history on file.     Social History:   Social History     Tobacco Use    Smoking status: Never    Smokeless tobacco: Never        Allergies:   Review of patient's allergies indicates:  No Known Allergies       Review of Systems   Review of Systems   See HPI for pertinent positives and negatives       Physical Exam     Vitals:    10/21/23 1025 10/21/23 1051 10/21/23 1122   BP: (!) 201/100  (!) 190/94   BP Location:   Left arm   Patient Position:   Sitting   Pulse: 75  69   Resp: 18 18 18   Temp: 98.5 °F (36.9 °C)  98.1 °F (36.7 °C)   TempSrc:   Oral   SpO2: 99%  99%   Weight: 77.1 kg (170 lb)     Height: 5' 4" (1.626 m)        Physical Exam  Vitals and nursing note reviewed.   Constitutional:       General: He is not in acute distress.     Appearance: Normal appearance. He is well-developed. He is not ill-appearing.   HENT:      Head: Normocephalic and atraumatic.   Eyes:      Extraocular Movements: Extraocular movements intact.      Conjunctiva/sclera: Conjunctivae normal.   Pulmonary:      Effort: Pulmonary effort is normal. No respiratory distress.   Musculoskeletal:         General: No deformity or signs of injury. Normal range of motion.      Cervical back: Normal range of motion. No rigidity.   Feet:      " Comments: Amputated left 2nd and 5th toes with well demarcated margins and healthy-appearing granulation tissue without malodorous discharge or erythema  Skin:     General: Skin is dry.      Coloration: Skin is not pale.      Findings: No rash.   Neurological:      General: No focal deficit present.      Mental Status: He is alert and oriented to person, place, and time.      Cranial Nerves: No cranial nerve deficit.      Motor: No weakness.      Coordination: Coordination normal.                        Diagnostic Study Results      Labs -   No results found for this or any previous visit (from the past 12 hour(s)).     Radiologic Studies -    No orders to display        Medications given in the ED-   Medications   HYDROcodone-acetaminophen  mg per tablet 1 tablet (1 tablet Oral Given 10/21/23 1051)           Medical Decision Making    I am the first provider for this patient.     I reviewed the vital signs, available nursing notes, past medical history, past surgical history, family history and social history.     Vital Signs:  Reviewed the patient's vital signs.     Pulse Oximetry Analysis and Interpretation:    99% on Room Air, normal      External Test Results (Pertinent to encounter):    Records Reviewed: Nursing Notes, Current Prescription Medications, and PMPAware     10/11/2023  10/11/2023   1  Hydrocodone-Acetamin 7.5-325 12.00  3   [facility]ong  [id number]   [facility] (1865)  0  30.00 MME  [place]    10/02/2023  10/02/2023   1  Hydrocodone-Acetamin 7.5-325 12.00  3  Ambrose Felizo  [id number]   [facility] (1865)  0  30.00 MME  [place]    09/26/2023 09/26/2023   1  Hydrocodone-Acetamin 7.5-325 12.00  3   [facility]ong  [id number]   [facility] (1865)  0  30.00 MME  [place]    09/13/2023 09/13/2023   1  Oxycodone-Acetaminophen  15.00  3   [facility]ong  [id number]   [facility] (1865)  0  75.00 MME  [place]    09/13/2023 09/13/2023   1  Gabapentin 300 Mg Capsule 90.00  30  [facility]  [id number]   [facility] (1865)  0   [place]    09/06/2023 08/30/2023   1   Oxycodone-Acetaminophen  15.00  3  Ja Thong  [id number]   [facility] (1865)  0           History Obtained By: Patient    Provider Notes: Rikki Graham is a 53 y.o. male with chronic foot pain    Co-morbidities Considered: s/p toe amputation     Differential Diagnosis: chronic pain      ED Course:    Patient presents with chronic left foot pain after digital amputations.  Given pain medication in ED.  no evidence of skin or wound infection on exam.  Discussed with him that prescriptions for controlled substances given that this is a chronic issue must come from his primary care provider or surgeon.  Discussed at length with patient his follow-up.  He is seen in [place] but has difficulties with transportation.  He does not primary care.  He is on managed hypertension and diabetes.  Place referral for him for primary care follow-up.  Discussed importance of this and management of chronic disease in preventing complications.  Requested patient follow-up with his surgeon as scheduled.  Reasons to return to ED discussed.         Problems Addressed:      Procedures:   Procedures       Diagnosis and Disposition     Critical Care:      DISCHARGE NOTE:       Rikki Graham's  results have been reviewed with him.  He has been counseled regarding his diagnosis, treatment, and plan.  He verbally conveys understanding and agreement of the signs, symptoms, diagnosis, treatment and prognosis and additionally agrees to follow up as discussed.  He also agrees with the care-plan and conveys that all of his questions have been answered.  I have also provided discharge instructions for him that include: educational information regarding their diagnosis and treatment, and list of reasons why they would want to return to the ED prior to their follow-up appointment, should his condition change. He has been provided with education for proper emergency department utilization.         CLINICAL IMPRESSION:         1. Post-operative  pain    2. Ulcer of left foot, with fat layer exposed    3. Hypertension, essential              PLAN:   1. DC  2.      Medication List        ASK your doctor about these medications      amLODIPine 10 MG tablet  Commonly known as: NORVASC  Take 1 tablet (10 mg total) by mouth once daily.     aspirin 81 MG EC tablet  Commonly known as: ECOTRIN  Take 1 tablet (81 mg total) by mouth once daily.     atorvastatin 80 MG tablet  Commonly known as: LIPITOR  Take 1 tablet (80 mg total) by mouth once daily.     cloNIDine 0.2 MG tablet  Commonly known as: CATAPRES     furosemide 40 MG tablet  Commonly known as: LASIX  Take 1 tablet (40 mg total) by mouth once daily.     gabapentin 100 MG capsule  Commonly known as: NEURONTIN  Take 1 capsule (100 mg total) by mouth 3 (three) times daily.     glipiZIDE 5 MG tablet  Commonly known as: GLUCOTROL     HYDROcodone-acetaminophen 5-325 mg per tablet  Commonly known as: NORCO  Take 1 tablet by mouth every 12 (twelve) hours as needed for Pain.     ibuprofen 800 MG tablet  Commonly known as: ADVIL,MOTRIN  Take 1 tablet (800 mg total) by mouth every 6 (six) hours as needed for Pain.     magnesium 250 mg Tab  Take 1 tablet (250 mg total) by mouth once daily.     metFORMIN 500 MG tablet  Commonly known as: GLUCOPHAGE  Take 1 tablet (500 mg total) by mouth daily with breakfast.     oxyCODONE-acetaminophen 7.5-325 mg per tablet  Commonly known as: PERCOCET  Take 1 tablet by mouth every 8 (eight) hours as needed for Pain.             3. Davidson Fonseca, DO  [address] [address]  [phone]    Schedule an appointment as soon as possible for a visit   Primary care follow up       _______________________________     Please note that this dictation was completed with [employer], the computer voice recognition software.  Quite often unanticipated grammatical, syntax, homophones, and other interpretive errors are inadvertently transcribed by the computer software.   Please disregard these errors.  Please excuse any errors that have escaped final proofreading.               Thad Franco MD  10/21/23 0797

## 2023-11-12 ENCOUNTER — HOSPITAL ENCOUNTER (EMERGENCY)
Facility: HOSPITAL | Age: 53
Discharge: HOME | End: 2023-11-12
Attending: EMERGENCY MEDICINE
Payer: MEDICAID

## 2023-11-12 VITALS
TEMPERATURE: 99 F | BODY MASS INDEX: 29.02 KG/M2 | OXYGEN SATURATION: 97 % | HEIGHT: 64 IN | HEART RATE: 72 BPM | SYSTOLIC BLOOD PRESSURE: 130 MMHG | RESPIRATION RATE: 18 BRPM | DIASTOLIC BLOOD PRESSURE: 95 MMHG | WEIGHT: 170 LBS

## 2023-11-12 DIAGNOSIS — G89.4 CHRONIC PAIN SYNDROME: Primary | ICD-10-CM

## 2023-11-12 DIAGNOSIS — M79.609 POSTOPERATIVE PAIN OF EXTREMITY: ICD-10-CM

## 2023-11-12 DIAGNOSIS — G89.18 POSTOPERATIVE PAIN OF EXTREMITY: ICD-10-CM

## 2023-11-12 PROCEDURE — 99284 EMERGENCY DEPT VISIT MOD MDM: CPT

## 2023-11-12 PROCEDURE — 96372 THER/PROPH/DIAG INJ SC/IM: CPT | Performed by: EMERGENCY MEDICINE

## 2023-11-12 PROCEDURE — 63600175 PHARM REV CODE 636 W HCPCS: Performed by: EMERGENCY MEDICINE

## 2023-11-12 RX ORDER — HYDROMORPHONE HYDROCHLORIDE 2 MG/ML
2 INJECTION, SOLUTION INTRAMUSCULAR; INTRAVENOUS; SUBCUTANEOUS
Status: COMPLETED | OUTPATIENT
Start: 2023-11-12 | End: 2023-11-12

## 2023-11-12 RX ORDER — KETOROLAC TROMETHAMINE 30 MG/ML
60 INJECTION, SOLUTION INTRAMUSCULAR; INTRAVENOUS
Status: COMPLETED | OUTPATIENT
Start: 2023-11-12 | End: 2023-11-12

## 2023-11-12 RX ADMIN — KETOROLAC TROMETHAMINE 60 MG: 30 INJECTION, SOLUTION INTRAMUSCULAR at 11:11

## 2023-11-12 RX ADMIN — HYDROMORPHONE HYDROCHLORIDE 2 MG: 2 INJECTION, SOLUTION INTRAMUSCULAR; INTRAVENOUS; SUBCUTANEOUS at 11:11

## 2023-11-12 NOTE — ED PROVIDER NOTES
Encounter Date: 11/12/2023       History     Chief Complaint   Patient presents with    Foot Pain     Chronic / recurring pain to left foot.      53-year-old male with history of diabetes, hypertension, neuropathy, left foot ulcer from diabetes, status post operation with toe amputation presents with chronic left foot pain.  Foot is clean, states he has been chasing after his grandchildren and thinks he overdid it.  Denies any fever.  No drainage.  He takes very good care of that foot, cleaning it 3 times a day.  Afebrile.  Not ill appearing, alert and oriented x4, GCS is 15      Review of patient's allergies indicates:  No Known Allergies  Past Medical History:   Diagnosis Date    Diabetes mellitus     Hypertension     Neuropathy      No past surgical history on file.  No family history on file.  Social History     Tobacco Use    Smoking status: Never    Smokeless tobacco: Never     Review of Systems   Constitutional:  Negative for fever.   HENT:  Negative for sore throat.    Respiratory:  Negative for shortness of breath.    Cardiovascular:  Negative for chest pain.   Gastrointestinal:  Negative for nausea.   Genitourinary:  Negative for dysuria.   Musculoskeletal:  Negative for back pain.   Skin:  Positive for wound. Negative for rash.   Neurological:  Negative for weakness.   Hematological:  Does not bruise/bleed easily.   All other systems reviewed and are negative.      Physical Exam     Initial Vitals [11/12/23 1118]   BP Pulse Resp Temp SpO2   (!) 130/95 72 18 98.5 °F (36.9 °C) 97 %      MAP       --         Physical Exam    Nursing note and vitals reviewed.  Constitutional: He appears well-developed and well-nourished. He is not diaphoretic. No distress.   HENT:   Head: Normocephalic and atraumatic.   Eyes: Conjunctivae and EOM are normal. Pupils are equal, round, and reactive to light. Right eye exhibits no discharge. Left eye exhibits no discharge. No scleral icterus.   Neck: Neck supple. No JVD present.    Normal range of motion.  Cardiovascular:  Normal rate, regular rhythm, normal heart sounds and intact distal pulses.           No murmur heard.  Pulmonary/Chest: Breath sounds normal. No stridor. No respiratory distress. He has no wheezes. He has no rhonchi. He has no rales. He exhibits no tenderness.   Abdominal: Abdomen is soft. Bowel sounds are normal. He exhibits no distension and no mass. There is no abdominal tenderness. There is no rebound and no guarding.   Musculoskeletal:         General: No tenderness or edema. Normal range of motion.      Cervical back: Normal range of motion and neck supple.     Neurological: He is alert and oriented to person, place, and time. He has normal strength. GCS score is 15. GCS eye subscore is 4. GCS verbal subscore is 5. GCS motor subscore is 6.   Skin: Skin is warm and dry. Capillary refill takes less than 2 seconds.   Left foot wrapped and just cleaned by patient.  He does not want to undo the dressing.  But dressing is clean and dry.  Foot is warm         ED Course   Procedures  Labs Reviewed - No data to display       Imaging Results    None          Medications   HYDROmorphone (PF) injection 2 mg (has no administration in time range)   ketorolac injection 60 mg (has no administration in time range)     Medical Decision Making  Risk  Prescription drug management.                          Medical Decision Making:   Differential Diagnosis:   Chronic pain syndrome, postop pain, diabetic neuropathy  ED Management:  We discussed the need that he gets his pain medication from her primary care physician, I will give him an injection today, but no opiate prescriptions will be given through the emergency department      Clinical Impression:   Final diagnoses:  [G89.4] Chronic pain syndrome (Primary)  [G89.18, M79.609] Postoperative pain of extremity        ED Disposition Condition    Discharge Stable          ED Prescriptions    None       Follow-up Information       Follow up  With Specialties Details Why Contact Info    Primary care physician  In 2 days               Avery Leonard MD  11/12/23 9493

## 2023-11-18 ENCOUNTER — HOSPITAL ENCOUNTER (EMERGENCY)
Facility: HOSPITAL | Age: 53
Discharge: HOME | End: 2023-11-18
Attending: EMERGENCY MEDICINE
Payer: MEDICAID

## 2023-11-18 VITALS
WEIGHT: 161 LBS | TEMPERATURE: 98 F | BODY MASS INDEX: 27.64 KG/M2 | DIASTOLIC BLOOD PRESSURE: 74 MMHG | SYSTOLIC BLOOD PRESSURE: 184 MMHG | RESPIRATION RATE: 18 BRPM | OXYGEN SATURATION: 100 % | HEART RATE: 73 BPM

## 2023-11-18 DIAGNOSIS — G89.4 CHRONIC PAIN DISORDER: Primary | ICD-10-CM

## 2023-11-18 PROCEDURE — 96372 THER/PROPH/DIAG INJ SC/IM: CPT | Performed by: CLINICAL NURSE SPECIALIST

## 2023-11-18 PROCEDURE — 99284 EMERGENCY DEPT VISIT MOD MDM: CPT

## 2023-11-18 PROCEDURE — 63600175 PHARM REV CODE 636 W HCPCS: Performed by: CLINICAL NURSE SPECIALIST

## 2023-11-18 RX ORDER — HYDROMORPHONE HYDROCHLORIDE 2 MG/ML
2 INJECTION, SOLUTION INTRAMUSCULAR; INTRAVENOUS; SUBCUTANEOUS ONCE
Status: COMPLETED | OUTPATIENT
Start: 2023-11-18 | End: 2023-11-18

## 2023-11-18 RX ORDER — KETOROLAC TROMETHAMINE 30 MG/ML
60 INJECTION, SOLUTION INTRAMUSCULAR; INTRAVENOUS ONCE
Status: COMPLETED | OUTPATIENT
Start: 2023-11-18 | End: 2023-11-18

## 2023-11-18 RX ADMIN — HYDROMORPHONE HYDROCHLORIDE 2 MG: 2 INJECTION, SOLUTION INTRAMUSCULAR; INTRAVENOUS; SUBCUTANEOUS at 11:11

## 2023-11-18 RX ADMIN — KETOROLAC TROMETHAMINE 60 MG: 30 INJECTION, SOLUTION INTRAMUSCULAR; INTRAVENOUS at 11:11

## 2023-11-18 NOTE — ED PROVIDER NOTES
Encounter Date: 11/18/2023       History     Chief Complaint   Patient presents with    Post-op Problem     Patient reports to the ER with pain at a previous spot of amputation on his foot.      53-year-old male presents emergency room with chronic pain syndrome of a amputation of his left foot.  See media for images        Review of patient's allergies indicates:  No Known Allergies  Past Medical History:   Diagnosis Date    Diabetes mellitus     Hypertension     Neuropathy      History reviewed. No pertinent surgical history.  History reviewed. No pertinent family history.  Social History     Tobacco Use    Smoking status: Never    Smokeless tobacco: Never     Review of Systems   Constitutional:  Negative for fever.   HENT:  Negative for sore throat.    Respiratory:  Negative for shortness of breath.    Cardiovascular:  Negative for chest pain.   Gastrointestinal:  Negative for nausea.   Genitourinary:  Negative for dysuria.   Musculoskeletal:  Negative for back pain.   Skin:  Positive for wound. Negative for rash.   Neurological:  Negative for weakness.   Hematological:  Does not bruise/bleed easily.   All other systems reviewed and are negative.      Physical Exam     Initial Vitals [11/18/23 1115]   BP Pulse Resp Temp SpO2   (!) 184/74 73 18 98 °F (36.7 °C) 100 %      MAP       --         Physical Exam    Nursing note and vitals reviewed.  Constitutional: He appears well-developed and well-nourished.   HENT:   Head: Normocephalic and atraumatic.   Eyes: Pupils are equal, round, and reactive to light.   Cardiovascular:  Regular rhythm.             Neurological: He is alert and oriented to person, place, and time.   Psychiatric: He has a normal mood and affect.         ED Course   Procedures  Labs Reviewed - No data to display       Imaging Results    None          Medications   HYDROmorphone (PF) injection 2 mg (2 mg Intramuscular Given 11/18/23 1122)   ketorolac injection 60 mg (60 mg Intramuscular Given  11/18/23 1122)     Medical Decision Making  Risk  Prescription drug management.                                   Clinical Impression:  Final diagnoses:  [G89.4] Chronic pain disorder (Primary)          ED Disposition Condition    Discharge Stable          ED Prescriptions    None       Follow-up Information    None          Ade Dykes, NP  11/18/23 3402

## 2023-11-21 ENCOUNTER — HOSPITAL ENCOUNTER (EMERGENCY)
Facility: HOSPITAL | Age: 53
Discharge: HOME | End: 2023-11-22
Attending: EMERGENCY MEDICINE
Payer: MEDICAID

## 2023-11-21 DIAGNOSIS — Z91.148 NONCOMPLIANCE W/MEDICATION TREATMENT DUE TO INTERMIT USE OF MEDICATION: ICD-10-CM

## 2023-11-21 DIAGNOSIS — G89.29 OTHER CHRONIC PAIN: Primary | ICD-10-CM

## 2023-11-21 PROCEDURE — 82962 GLUCOSE BLOOD TEST: CPT

## 2023-11-21 PROCEDURE — 99284 EMERGENCY DEPT VISIT MOD MDM: CPT

## 2023-11-22 VITALS
WEIGHT: 169.38 LBS | SYSTOLIC BLOOD PRESSURE: 180 MMHG | HEIGHT: 64 IN | TEMPERATURE: 98 F | OXYGEN SATURATION: 97 % | BODY MASS INDEX: 28.92 KG/M2 | RESPIRATION RATE: 20 BRPM | DIASTOLIC BLOOD PRESSURE: 81 MMHG | HEART RATE: 80 BPM

## 2023-11-22 LAB — POCT GLUCOSE: 153 MG/DL (ref 70–110)

## 2023-11-22 PROCEDURE — 25000003 PHARM REV CODE 250: Performed by: EMERGENCY MEDICINE

## 2023-11-22 PROCEDURE — 96372 THER/PROPH/DIAG INJ SC/IM: CPT | Performed by: EMERGENCY MEDICINE

## 2023-11-22 PROCEDURE — 63600175 PHARM REV CODE 636 W HCPCS: Performed by: EMERGENCY MEDICINE

## 2023-11-22 RX ORDER — AMLODIPINE BESYLATE 10 MG/1
10 TABLET ORAL
Status: COMPLETED | OUTPATIENT
Start: 2023-11-22 | End: 2023-11-22

## 2023-11-22 RX ORDER — FUROSEMIDE 40 MG/1
40 TABLET ORAL
Status: COMPLETED | OUTPATIENT
Start: 2023-11-22 | End: 2023-11-22

## 2023-11-22 RX ORDER — KETOROLAC TROMETHAMINE 30 MG/ML
30 INJECTION, SOLUTION INTRAMUSCULAR; INTRAVENOUS
Status: COMPLETED | OUTPATIENT
Start: 2023-11-22 | End: 2023-11-22

## 2023-11-22 RX ADMIN — FUROSEMIDE 40 MG: 40 TABLET ORAL at 12:11

## 2023-11-22 RX ADMIN — KETOROLAC TROMETHAMINE 30 MG: 30 INJECTION, SOLUTION INTRAMUSCULAR; INTRAVENOUS at 12:11

## 2023-11-22 RX ADMIN — AMLODIPINE BESYLATE 10 MG: 10 TABLET ORAL at 12:11

## 2023-11-22 NOTE — ED PROVIDER NOTES
"Encounter Date: 11/21/2023       History     Chief Complaint   Patient presents with    Pain And Symptom Management     Patient reports toe amputation 3 months ago. Fifth digit on left foot "doesn't seem to be healing." Pt reports "it's leaking clear fluid and a little bit of blood/swollen."   Has not been able to take fluid pills in 3 wks and HTN meds for 3 days.     52 yo male with history of chronic pain to L foot s/p multiple digit amputations over the summer. No new injury. No fever. Admits noncompliant with medications and has not seen PCP or foot doctor for "a while." Gradual onset. Multiple prior similar.       Review of patient's allergies indicates:  No Known Allergies  Past Medical History:   Diagnosis Date    Diabetes mellitus     Hypertension     Neuropathy      No past surgical history on file.  No family history on file.  Social History     Tobacco Use    Smoking status: Never    Smokeless tobacco: Never     Review of Systems   Constitutional: Negative.    Respiratory: Negative.     Cardiovascular: Negative.    Gastrointestinal: Negative.    All other systems reviewed and are negative.      Physical Exam     Initial Vitals [11/21/23 2325]   BP Pulse Resp Temp SpO2   (!) 209/113 80 20 98.2 °F (36.8 °C) 97 %      MAP       --         Physical Exam    Nursing note and vitals reviewed.  Constitutional: He appears well-developed and well-nourished. He is not diaphoretic. No distress.   HENT:   Head: Normocephalic and atraumatic.   Eyes: EOM are normal. Pupils are equal, round, and reactive to light.   Neck: Neck supple.   Normal range of motion.  Cardiovascular:  Normal rate, regular rhythm and intact distal pulses.           Pulmonary/Chest: Breath sounds normal. No respiratory distress. He has no wheezes. He has no rales.   Abdominal: Abdomen is soft. Bowel sounds are normal. He exhibits no distension. There is no abdominal tenderness. There is no rebound.   Musculoskeletal:         General: No " tenderness or edema. Normal range of motion.      Cervical back: Normal range of motion and neck supple.     Neurological: He is alert and oriented to person, place, and time.   Skin: Skin is warm and dry. Capillary refill takes less than 2 seconds.   Psychiatric: He has a normal mood and affect. Thought content normal.                   ED Course   Procedures  Labs Reviewed   POCT GLUCOSE - Abnormal; Notable for the following components:       Result Value    POCT Glucose 153 (*)     All other components within normal limits          Imaging Results    None          Medications   ketorolac injection 30 mg (30 mg Intramuscular Given 11/22/23 0022)   amLODIPine tablet 10 mg (10 mg Oral Given 11/22/23 0030)   furosemide tablet 40 mg (40 mg Oral Given 11/22/23 0029)     Medical Decision Making  Images of foot appear identical to images obtained about a month ago. Vitals stable. Encouraged to follow up with PCP. Asking for dilaudid by name, causing concern for me. Counseled to follow up with foot doc vs PCP for further management of pain.     Problems Addressed:  Noncompliance w/medication treatment due to intermit use of medication: chronic illness or injury  Other chronic pain: chronic illness or injury    Amount and/or Complexity of Data Reviewed  Labs: ordered. Decision-making details documented in ED Course.     Details: POC glucose with acceptable results    Risk  Prescription drug management.                                   Clinical Impression:  Final diagnoses:  [G89.29] Other chronic pain (Primary)  [Z91.148] Noncompliance w/medication treatment due to intermit use of medication          ED Disposition Condition    Discharge Stable          ED Prescriptions    None       Follow-up Information    None          Niranjan Chaudhry MD  11/22/23 2015

## 2023-11-29 ENCOUNTER — HOSPITAL ENCOUNTER (EMERGENCY)
Facility: HOSPITAL | Age: 53
Discharge: HOME | End: 2023-11-29
Attending: EMERGENCY MEDICINE
Payer: MEDICAID

## 2023-11-29 VITALS
DIASTOLIC BLOOD PRESSURE: 82 MMHG | BODY MASS INDEX: 29.02 KG/M2 | OXYGEN SATURATION: 99 % | SYSTOLIC BLOOD PRESSURE: 124 MMHG | HEIGHT: 64 IN | RESPIRATION RATE: 20 BRPM | WEIGHT: 170 LBS | TEMPERATURE: 98 F | HEART RATE: 80 BPM

## 2023-11-29 DIAGNOSIS — G89.4 CHRONIC PAIN SYNDROME: ICD-10-CM

## 2023-11-29 DIAGNOSIS — R11.2 NAUSEA AND VOMITING, UNSPECIFIED VOMITING TYPE: Primary | ICD-10-CM

## 2023-11-29 LAB
CTP QC/QA: YES
CTP QC/QA: YES
POC MOLECULAR INFLUENZA A AGN: NEGATIVE
POC MOLECULAR INFLUENZA B AGN: NEGATIVE
SARS-COV-2 RDRP RESP QL NAA+PROBE: NEGATIVE

## 2023-11-29 PROCEDURE — 87502 INFLUENZA DNA AMP PROBE: CPT

## 2023-11-29 PROCEDURE — 87635 SARS-COV-2 COVID-19 AMP PRB: CPT | Performed by: EMERGENCY MEDICINE

## 2023-11-29 PROCEDURE — 96372 THER/PROPH/DIAG INJ SC/IM: CPT | Performed by: EMERGENCY MEDICINE

## 2023-11-29 PROCEDURE — 99284 EMERGENCY DEPT VISIT MOD MDM: CPT

## 2023-11-29 PROCEDURE — 63600175 PHARM REV CODE 636 W HCPCS: Performed by: EMERGENCY MEDICINE

## 2023-11-29 RX ORDER — ONDANSETRON 4 MG/1
4 TABLET, ORALLY DISINTEGRATING ORAL EVERY 8 HOURS PRN
Qty: 12 TABLET | Refills: 0 | Status: SHIPPED | OUTPATIENT
Start: 2023-11-29

## 2023-11-29 RX ORDER — HYDROMORPHONE HYDROCHLORIDE 2 MG/ML
2 INJECTION, SOLUTION INTRAMUSCULAR; INTRAVENOUS; SUBCUTANEOUS
Status: COMPLETED | OUTPATIENT
Start: 2023-11-29 | End: 2023-11-29

## 2023-11-29 RX ORDER — KETOROLAC TROMETHAMINE 30 MG/ML
60 INJECTION, SOLUTION INTRAMUSCULAR; INTRAVENOUS
Status: COMPLETED | OUTPATIENT
Start: 2023-11-29 | End: 2023-11-29

## 2023-11-29 RX ADMIN — KETOROLAC TROMETHAMINE 60 MG: 30 INJECTION, SOLUTION INTRAMUSCULAR at 08:11

## 2023-11-29 RX ADMIN — HYDROMORPHONE HYDROCHLORIDE 2 MG: 2 INJECTION, SOLUTION INTRAMUSCULAR; INTRAVENOUS; SUBCUTANEOUS at 08:11

## 2023-11-29 NOTE — ED PROVIDER NOTES
Encounter Date: 11/29/2023       History     Chief Complaint   Patient presents with    Emesis     Pt to the ER w/ complaints of vomiting beginning yesterday at 1400, sore throat, cough, chills, and chronic L foot pain.     53-year-old male with a history of hypertension, diabetes, neuropathy, left foot gangrene due to diabetic foot ulcers presents the emergency depart with nausea vomiting that began yesterday, sinus congestion and sore throat.  Denies any fever.  Also complaining of chronic left foot pain, requesting an injection.  Denies any chest pain or shortness of breath.  No blood in stool or vomit.  He is not ill appearing, steady gait with a cane.      Review of patient's allergies indicates:  No Known Allergies  Past Medical History:   Diagnosis Date    Diabetes mellitus     Hypertension     Neuropathy      No past surgical history on file.  No family history on file.  Social History     Tobacco Use    Smoking status: Never    Smokeless tobacco: Never     Review of Systems   Constitutional:  Negative for fever.   HENT:  Positive for congestion and sore throat.    Respiratory:  Negative for shortness of breath.    Cardiovascular:  Negative for chest pain.   Gastrointestinal:  Negative for nausea.   Genitourinary:  Negative for dysuria.   Musculoskeletal:  Negative for back pain.   Skin:  Negative for rash.   Neurological:  Negative for weakness.   Hematological:  Does not bruise/bleed easily.   All other systems reviewed and are negative.      Physical Exam     Initial Vitals [11/29/23 0748]   BP Pulse Resp Temp SpO2   124/82 80 18 98.4 °F (36.9 °C) 99 %      MAP       --         Physical Exam    Nursing note and vitals reviewed.  Constitutional: He appears well-developed and well-nourished. He is not diaphoretic. No distress.   HENT:   Head: Normocephalic and atraumatic.   Eyes: Conjunctivae and EOM are normal. Pupils are equal, round, and reactive to light. Right eye exhibits no discharge. Left eye  exhibits no discharge. No scleral icterus.   Neck: Neck supple. No JVD present.   Normal range of motion.  Cardiovascular:  Normal rate, regular rhythm, normal heart sounds and intact distal pulses.           No murmur heard.  Pulmonary/Chest: Breath sounds normal. No stridor. No respiratory distress. He has no wheezes. He has no rhonchi. He has no rales. He exhibits no tenderness.   Abdominal: Abdomen is soft. Bowel sounds are normal. He exhibits no distension and no mass. There is no abdominal tenderness. There is no rebound and no guarding.   Musculoskeletal:         General: Tenderness present. No edema. Normal range of motion.      Cervical back: Normal range of motion and neck supple.      Comments: Chronic left foot ulcerations, no drainage, warm to touch, no cyanosis     Neurological: He is alert and oriented to person, place, and time. He has normal strength. GCS score is 15. GCS eye subscore is 4. GCS verbal subscore is 5. GCS motor subscore is 6.   Skin: Skin is warm and dry. Capillary refill takes less than 2 seconds.         ED Course   Procedures  Labs Reviewed   SARS-COV-2 RDRP GENE    Narrative:     This test utilizes isothermal nucleic acid amplification technology to detect the SARS-CoV-2 RdRp nucleic acid segment. The analytical sensitivity (limit of detection) is 500 copies/swab.     A POSITIVE result is indicative of the presence of SARS-CoV-2 RNA; clinical correlation with patient history and other diagnostic information is necessary to determine patient infection status.    A NEGATIVE result means that SARS-CoV-2 nucleic acids are not present above the limit of detection. A NEGATIVE result should be treated as presumptive. It does not rule out the possibility of COVID-19 and should not be the sole basis for treatment decisions. If COVID-19 is strongly suspected based on clinical and exposure history, re-testing using an alternate molecular assay should be considered.     This test is only for  use under the Food and Drug Administration s Emergency Use Authorization (EUA).     Commercial kits are provided by [employer]. Performance characteristics of the EUA have been independently verified by [facility] Department of Pathology and Laboratory Medicine.   _________________________________________________________________   The authorized Fact Sheet for Healthcare Providers and the authorized Fact Sheet for Patients of the ID NOW COVID-19 are available on the FDA website:    https://www.fda.gov/media/950498/download      https://www.fda.gov/media/881181/download      POCT INFLUENZA A/B MOLECULAR          Imaging Results    None          Medications   HYDROmorphone (PF) injection 2 mg (2 mg Intramuscular Given 11/29/23 0828)   ketorolac injection 60 mg (60 mg Intramuscular Given 11/29/23 0828)     Medical Decision Making  Amount and/or Complexity of Data Reviewed  Labs: ordered.    Risk  Prescription drug management.               ED Course as of 11/29/23 0843 Wed Nov 29, 2023   0842 Flu and COVID are negative.  He is not ill appearing, stable for discharge and follow up to primary care physician [SD]      ED Course User Index  [SD] Avery Leonard MD               Medical Decision Making:   Differential Diagnosis:   Viral syndrome, chronic pain syndrome, influenza             Clinical Impression:  Final diagnoses:  [R11.2] Nausea and vomiting, unspecified vomiting type (Primary)  [G89.4] Chronic pain syndrome          ED Disposition Condition    Discharge Stable          ED Prescriptions       Medication Sig Dispense Start Date End Date Auth. Provider    ondansetron (ZOFRAN-ODT) 4 MG TbDL Take 1 tablet (4 mg total) by mouth every 8 (eight) hours as needed (Nausea and vomiting). 12 tablet 11/29/2023 -- Avery Leonard MD          Follow-up Information       Follow up With Specialties Details Why Contact Info Additional Information    Primary care physician  In 2 days       [facility] [facility]  - Emergency Department Emergency Medicine  As needed, If symptoms worsen [address] [facility] [address]  [phone] Floor 1             Avery Leonard MD  11/29/23 0803

## 2023-12-06 ENCOUNTER — CLINICAL SUPPORT (OUTPATIENT)
Dept: WOUND CARE | Facility: HOSPITAL | Age: 53
End: 2023-12-06
Attending: SURGERY
Payer: MEDICAID

## 2023-12-06 VITALS
RESPIRATION RATE: 20 BRPM | HEART RATE: 87 BPM | DIASTOLIC BLOOD PRESSURE: 110 MMHG | SYSTOLIC BLOOD PRESSURE: 197 MMHG | TEMPERATURE: 98 F

## 2023-12-06 DIAGNOSIS — R09.89 DECREASED PULSES IN FEET: ICD-10-CM

## 2023-12-06 DIAGNOSIS — L97.509 TYPE 2 DIABETES MELLITUS WITH FOOT ULCER, WITHOUT LONG-TERM CURRENT USE OF INSULIN: ICD-10-CM

## 2023-12-06 DIAGNOSIS — L97.522 ULCER OF LEFT FOOT, WITH FAT LAYER EXPOSED: Primary | ICD-10-CM

## 2023-12-06 DIAGNOSIS — E11.621 TYPE 2 DIABETES MELLITUS WITH FOOT ULCER, WITHOUT LONG-TERM CURRENT USE OF INSULIN: ICD-10-CM

## 2023-12-06 DIAGNOSIS — I70.262 ATHEROSCLEROSIS OF NATIVE ARTERY OF LEFT LOWER EXTREMITY WITH GANGRENE: ICD-10-CM

## 2023-12-06 DIAGNOSIS — I73.9 PAD (PERIPHERAL ARTERY DISEASE): ICD-10-CM

## 2023-12-06 PROCEDURE — 99215 OFFICE O/P EST HI 40 MIN: CPT

## 2023-12-06 RX ORDER — COLLAGENASE SANTYL 250 [ARB'U]/G
OINTMENT TOPICAL DAILY
Qty: 30 G | Refills: 2 | Status: SHIPPED | OUTPATIENT
Start: 2023-12-06

## 2023-12-06 RX ORDER — LEVOFLOXACIN 500 MG/1
500 TABLET, FILM COATED ORAL DAILY
Qty: 10 TABLET | Refills: 0 | Status: SHIPPED | OUTPATIENT
Start: 2023-12-06

## 2023-12-14 ENCOUNTER — TELEPHONE (OUTPATIENT)
Dept: VASCULAR SURGERY | Facility: CLINIC | Age: 53
End: 2023-12-14
Payer: MEDICAID

## 2023-12-14 NOTE — TELEPHONE ENCOUNTER
----- Message from Omid Balderrama MD sent at 10/20/2023  8:28 AM CDT -----  Please call patient and have him f/u with me with ordered studies asap, thank you!

## 2023-12-24 ENCOUNTER — HOSPITAL ENCOUNTER (EMERGENCY)
Facility: HOSPITAL | Age: 53
Discharge: HOME | End: 2023-12-24
Attending: EMERGENCY MEDICINE
Payer: MEDICAID

## 2023-12-24 VITALS
TEMPERATURE: 98 F | RESPIRATION RATE: 18 BRPM | WEIGHT: 170 LBS | HEART RATE: 77 BPM | BODY MASS INDEX: 29.02 KG/M2 | HEIGHT: 64 IN | OXYGEN SATURATION: 99 % | DIASTOLIC BLOOD PRESSURE: 85 MMHG | SYSTOLIC BLOOD PRESSURE: 128 MMHG

## 2023-12-24 DIAGNOSIS — G89.28 CHRONIC POST-OPERATIVE PAIN: Primary | ICD-10-CM

## 2023-12-24 PROCEDURE — 96372 THER/PROPH/DIAG INJ SC/IM: CPT | Performed by: EMERGENCY MEDICINE

## 2023-12-24 PROCEDURE — 25000003 PHARM REV CODE 250: Performed by: EMERGENCY MEDICINE

## 2023-12-24 PROCEDURE — 63600175 PHARM REV CODE 636 W HCPCS: Performed by: EMERGENCY MEDICINE

## 2023-12-24 PROCEDURE — 99284 EMERGENCY DEPT VISIT MOD MDM: CPT

## 2023-12-24 RX ORDER — HYDROCODONE BITARTRATE AND ACETAMINOPHEN 10; 325 MG/1; MG/1
1 TABLET ORAL
Status: COMPLETED | OUTPATIENT
Start: 2023-12-24 | End: 2023-12-24

## 2023-12-24 RX ORDER — HYDROCODONE BITARTRATE AND ACETAMINOPHEN 5; 325 MG/1; MG/1
1 TABLET ORAL
Status: DISCONTINUED | OUTPATIENT
Start: 2023-12-24 | End: 2023-12-24

## 2023-12-24 RX ORDER — KETOROLAC TROMETHAMINE 30 MG/ML
15 INJECTION, SOLUTION INTRAMUSCULAR; INTRAVENOUS
Status: COMPLETED | OUTPATIENT
Start: 2023-12-24 | End: 2023-12-24

## 2023-12-24 RX ADMIN — KETOROLAC TROMETHAMINE 15 MG: 30 INJECTION, SOLUTION INTRAMUSCULAR; INTRAVENOUS at 07:12

## 2023-12-24 RX ADMIN — HYDROCODONE BITARTRATE AND ACETAMINOPHEN 1 TABLET: 10; 325 TABLET ORAL at 07:12

## 2023-12-24 NOTE — ED PROVIDER NOTES
EMERGENCY DEPARTMENT HISTORY AND PHYSICAL EXAM     This note is dictated on M*Modal word recognition program.  There are word recognition mistakes and grammatical errors that are occasionally missed on review.     Date: 12/24/2023   Patient Name: Rikki Graham       History of Presenting Illness      Chief Complaint   Patient presents with    Pain And Symptom Management     Patient to ED for chronic pain to left foot post amputation. Reports foul drainage but states this is being treated by [facility]. Next appt for wound care 01/03/24. States he just wants something for the pain.        0730   Rikki Graham is a 53 y.o. male with PMHX of toe osteomyelitis status post left 2nd 5th toe amputation on August 29th, PAD who presents to the emergency department C/O foot pain.      Patient reports pain at surgical site of left foot.  Has been seen at wound care and states he is to have a debridement next week.  Reports clears discharge and bad odor.  No fever.  Patient states he is here for pain control.  Requesting Dilaudid.    PCP: No, Primary Doctor        Current Facility-Administered Medications   Medication Dose Route Frequency Provider Last Rate Last Admin    HYDROcodone-acetaminophen  mg per tablet 1 tablet  1 tablet Oral ED 1 Time Thad Franco MD        ketorolac injection 15 mg  15 mg Intramuscular ED 1 Time Thad Franco MD         Current Outpatient Medications   Medication Sig Dispense Refill    amLODIPine (NORVASC) 10 MG tablet Take 1 tablet (10 mg total) by mouth once daily. 30 tablet 11    aspirin (ECOTRIN) 81 MG EC tablet Take 1 tablet (81 mg total) by mouth once daily. 360 tablet 0    atorvastatin (LIPITOR) 80 MG tablet Take 1 tablet (80 mg total) by mouth once daily. 90 tablet 3    cloNIDine (CATAPRES) 0.2 MG tablet Take 0.2 mg by mouth every 12 (twelve) hours.      collagenase (SANTYL) ointment Apply topically once daily. 30 g 2    furosemide (LASIX) 40 MG tablet  "Take 1 tablet (40 mg total) by mouth once daily. 30 tablet 0    gabapentin (NEURONTIN) 100 MG capsule Take 1 capsule (100 mg total) by mouth 3 (three) times daily. 90 capsule 11    glipiZIDE (GLUCOTROL) 5 MG tablet Take 5 mg by mouth every morning.      HYDROcodone-acetaminophen (NORCO) 5-325 mg per tablet Take 1 tablet by mouth every 12 (twelve) hours as needed for Pain. 14 tablet 0    ibuprofen (ADVIL,MOTRIN) 800 MG tablet Take 1 tablet (800 mg total) by mouth every 6 (six) hours as needed for Pain. 20 tablet 0    levoFLOXacin (LEVAQUIN) 500 MG tablet Take 1 tablet (500 mg total) by mouth once daily. 10 tablet 0    magnesium 250 mg Tab Take 1 tablet (250 mg total) by mouth once daily. 30 tablet 0    metFORMIN (GLUCOPHAGE) 500 MG tablet Take 1 tablet (500 mg total) by mouth daily with breakfast. 30 tablet 0    ondansetron (ZOFRAN-ODT) 4 MG TbDL Take 1 tablet (4 mg total) by mouth every 8 (eight) hours as needed (Nausea and vomiting). 12 tablet 0    oxyCODONE-acetaminophen (PERCOCET) 7.5-325 mg per tablet Take 1 tablet by mouth every 8 (eight) hours as needed for Pain. 12 tablet 0           Past History     Past Medical History:   Past Medical History:   Diagnosis Date    Diabetes mellitus     Hypertension     Neuropathy         Past Surgical History:   Past Surgical History:   Procedure Laterality Date    TOE AMPUTATION Left         Family History:   History reviewed. No pertinent family history.     Social History:   Social History     Tobacco Use    Smoking status: Never    Smokeless tobacco: Never        Allergies:   Review of patient's allergies indicates:  No Known Allergies       Review of Systems   Review of Systems   See HPI for pertinent positives and negatives       Physical Exam     Vitals:    12/24/23 0716 12/24/23 0718 12/24/23 0751   BP:  128/85    Pulse:  77    Resp:  18 (P) 18   Temp:  97.7 °F (36.5 °C)    TempSrc:  Oral    SpO2:  99%    Weight: 77.1 kg (170 lb)     Height: 5' 4" (1.626 m)      "   Physical Exam  Vitals and nursing note reviewed.   Constitutional:       General: He is not in acute distress.     Appearance: Normal appearance. He is well-developed. He is not ill-appearing.   HENT:      Head: Normocephalic and atraumatic.   Eyes:      Extraocular Movements: Extraocular movements intact.      Conjunctiva/sclera: Conjunctivae normal.   Pulmonary:      Effort: Pulmonary effort is normal. No respiratory distress.   Musculoskeletal:         General: Normal range of motion.      Cervical back: Normal range of motion. No rigidity.      Comments: Left 2nd and 5th toe amputation, mild edema without cellulitis or crepitus, open wounds with granulation tissue, when compared to evaluation in ED from October this year appears improved   Skin:     General: Skin is dry.      Coloration: Skin is not pale.   Neurological:      General: No focal deficit present.      Mental Status: He is alert and oriented to person, place, and time.      Cranial Nerves: No cranial nerve deficit.      Motor: No weakness.      Coordination: Coordination normal.              Diagnostic Study Results      Labs -   No results found for this or any previous visit (from the past 12 hour(s)).     Radiologic Studies -    No orders to display        Medications given in the ED-   Medications   ketorolac injection 15 mg (15 mg Intramuscular Incomplete 12/24/23 0751)   HYDROcodone-acetaminophen  mg per tablet 1 tablet (1 tablet Oral Incomplete 12/24/23 0751)           Medical Decision Making    I am the first provider for this patient.     I reviewed the vital signs, available nursing notes, past medical history, past surgical history, family history and social history.     Vital Signs:  Reviewed the patient's vital signs.     Pulse Oximetry Analysis and Interpretation:    99% on Room Air, normal        External Test Results (Pertinent to encounter):    Records Reviewed: Nursing Notes, Current Prescription Medications, Old Medical  Records, and External Medical Records     History Obtained By: Patient    Provider Notes: Rikki Graham is a 53 y.o. male with chronic left foot pain    Co-morbidities Considered:  History of osteomyelitis    Differential Diagnosis:  Chronic pain syndrome, osteomyelitis      ED Course:    Surgical site appears well.  Patient afebrile.  Patient reports he has follow-up established with wound care.    Saw patient in October this year for similar presentation.  Reviewed images from that presentation to his visit today and wounds appear improved from prior.    He has a foot radiograph as well as CTA runoff ordered for him by Dr. Welch scheduled for next week    Reviewed his chart and I see that his vascular surgeon has try to contact him to set up follow-up appointment.  Patient states he is unaware of this.  Requested he call vascular surgery to see about establishing a follow-up appointment as they have reached out to him on 2 occasions now    Discussed with him about pain management in the emergency department and that chronic pain needs to be addressed by his primary team.  Will give dose of pain medication and Toradol in the emergency department.                 Problems Addressed:  Chronic foot pain, post surgical wounds    Procedures:   Procedures       Diagnosis and Disposition     Critical Care:      DISCHARGE NOTE:       Rikki Graham's  results have been reviewed with him.  He has been counseled regarding his diagnosis, treatment, and plan.  He verbally conveys understanding and agreement of the signs, symptoms, diagnosis, treatment and prognosis and additionally agrees to follow up as discussed.  He also agrees with the care-plan and conveys that all of his questions have been answered.  I have also provided discharge instructions for him that include: educational information regarding their diagnosis and treatment, and list of reasons why they would want to return to the ED prior to their  follow-up appointment, should his condition change. He has been provided with education for proper emergency department utilization.         CLINICAL IMPRESSION:         1. Chronic post-operative pain              PLAN:   1. Discharge Home  2.      Medication List        ASK your doctor about these medications      amLODIPine 10 MG tablet  Commonly known as: NORVASC  Take 1 tablet (10 mg total) by mouth once daily.     aspirin 81 MG EC tablet  Commonly known as: ECOTRIN  Take 1 tablet (81 mg total) by mouth once daily.     atorvastatin 80 MG tablet  Commonly known as: LIPITOR  Take 1 tablet (80 mg total) by mouth once daily.     cloNIDine 0.2 MG tablet  Commonly known as: CATAPRES     furosemide 40 MG tablet  Commonly known as: LASIX  Take 1 tablet (40 mg total) by mouth once daily.     gabapentin 100 MG capsule  Commonly known as: NEURONTIN  Take 1 capsule (100 mg total) by mouth 3 (three) times daily.     glipiZIDE 5 MG tablet  Commonly known as: GLUCOTROL     HYDROcodone-acetaminophen 5-325 mg per tablet  Commonly known as: NORCO  Take 1 tablet by mouth every 12 (twelve) hours as needed for Pain.     ibuprofen 800 MG tablet  Commonly known as: ADVIL,MOTRIN  Take 1 tablet (800 mg total) by mouth every 6 (six) hours as needed for Pain.     levoFLOXacin 500 MG tablet  Commonly known as: LEVAQUIN  Take 1 tablet (500 mg total) by mouth once daily.     magnesium 250 mg Tab  Take 1 tablet (250 mg total) by mouth once daily.     metFORMIN 500 MG tablet  Commonly known as: GLUCOPHAGE  Take 1 tablet (500 mg total) by mouth daily with breakfast.     ondansetron 4 MG Tbdl  Commonly known as: ZOFRAN-ODT  Take 1 tablet (4 mg total) by mouth every 8 (eight) hours as needed (Nausea and vomiting).     oxyCODONE-acetaminophen 7.5-325 mg per tablet  Commonly known as: PERCOCET  Take 1 tablet by mouth every 8 (eight) hours as needed for Pain.     SantyL ointment  Generic drug: collagenase  Apply topically once daily.             3.  Rosmery Delacruz, FNP  [address] [facility] [address]  [phone]    Schedule an appointment as soon as possible for a visit       Belinda Welch MD  [address] [facility], [place] [address]  [phone]    Schedule an appointment as soon as possible for a visit       Omid Balderrama MD  [address] [address]  [phone]    Call          _______________________________     Please note that this dictation was completed with [employer], the computer voice recognition software.  Quite often unanticipated grammatical, syntax, homophones, and other interpretive errors are inadvertently transcribed by the computer software.  Please disregard these errors.  Please excuse any errors that have escaped final proofreading.             Thad Franco MD  12/24/23 0755

## 2023-12-31 ENCOUNTER — HOSPITAL ENCOUNTER (EMERGENCY)
Facility: HOSPITAL | Age: 53
Discharge: HOME | End: 2023-12-31
Attending: EMERGENCY MEDICINE
Payer: MEDICAID

## 2023-12-31 VITALS
WEIGHT: 170 LBS | DIASTOLIC BLOOD PRESSURE: 74 MMHG | OXYGEN SATURATION: 100 % | TEMPERATURE: 98 F | HEIGHT: 64 IN | BODY MASS INDEX: 29.02 KG/M2 | SYSTOLIC BLOOD PRESSURE: 183 MMHG | HEART RATE: 86 BPM | RESPIRATION RATE: 18 BRPM

## 2023-12-31 DIAGNOSIS — G89.28 CHRONIC POSTOPERATIVE PAIN: Primary | ICD-10-CM

## 2023-12-31 PROCEDURE — 96372 THER/PROPH/DIAG INJ SC/IM: CPT | Performed by: NURSE PRACTITIONER

## 2023-12-31 PROCEDURE — 25000003 PHARM REV CODE 250: Performed by: NURSE PRACTITIONER

## 2023-12-31 PROCEDURE — 63600175 PHARM REV CODE 636 W HCPCS: Performed by: NURSE PRACTITIONER

## 2023-12-31 PROCEDURE — 99284 EMERGENCY DEPT VISIT MOD MDM: CPT

## 2023-12-31 RX ORDER — OXYCODONE AND ACETAMINOPHEN 5; 325 MG/1; MG/1
1 TABLET ORAL
Status: COMPLETED | OUTPATIENT
Start: 2023-12-31 | End: 2023-12-31

## 2023-12-31 RX ORDER — KETOROLAC TROMETHAMINE 30 MG/ML
30 INJECTION, SOLUTION INTRAMUSCULAR; INTRAVENOUS
Status: COMPLETED | OUTPATIENT
Start: 2023-12-31 | End: 2023-12-31

## 2023-12-31 RX ADMIN — KETOROLAC TROMETHAMINE 30 MG: 30 INJECTION, SOLUTION INTRAMUSCULAR; INTRAVENOUS at 12:12

## 2023-12-31 RX ADMIN — OXYCODONE HYDROCHLORIDE AND ACETAMINOPHEN 1 TABLET: 5; 325 TABLET ORAL at 12:12

## 2023-12-31 NOTE — DISCHARGE INSTRUCTIONS
Continue home medications as directed.  Follow-up with wound care as scheduled for further evaluation and management of pain.  Return the emergency room for worsening condition.

## 2023-12-31 NOTE — ED PROVIDER NOTES
"Encounter Date: 12/31/2023       History     Chief Complaint   Patient presents with    Foot Swelling     Pt stated that he had toe amputation in September - presents to ED with concerns of swelling to foot/ankle and increased sensitivity/pain.      This is a 53-year-old black male with a history of PID, hypertension, diabetes mellitus type 2, and toe osteomyelitis status post left 2nd 5th toe amputation on August 29th, who presents to the emergency department with complaints of left foot pain.  Patient reports he has been performing wound care as directed and has an appointment in 3 days for wound care follow-up and debridement, however states "I just can not take the pain anymore".  He reports pain to surgical amputation site of 2nd and 5th toes along with surrounding localized swelling that is chronic.  He denies fever, vomiting, or change in symptoms otherwise.      Review of patient's allergies indicates:  No Known Allergies  Past Medical History:   Diagnosis Date    Diabetes mellitus     Hypertension     Neuropathy      Past Surgical History:   Procedure Laterality Date    TOE AMPUTATION Left      No family history on file.  Social History     Tobacco Use    Smoking status: Never    Smokeless tobacco: Never     Review of Systems   Constitutional:  Negative for fever.   Gastrointestinal:  Negative for vomiting.   Skin:  Positive for wound.       Physical Exam     Initial Vitals   BP Pulse Resp Temp SpO2   12/31/23 1156 12/31/23 1155 12/31/23 1155 12/31/23 1155 12/31/23 1155   (!) 183/74 86 16 98 °F (36.7 °C) 100 %      MAP       --                Physical Exam    Nursing note and vitals reviewed.  Constitutional: He appears well-developed and well-nourished. He is active. No distress.   HENT:   Head: Normocephalic and atraumatic.   Eyes: EOM are normal. Pupils are equal, round, and reactive to light.   Neck: Neck supple.   Normal range of motion.  Cardiovascular:  Normal rate.           Pulmonary/Chest: No " respiratory distress.   Musculoskeletal:         General: Normal range of motion.      Cervical back: Normal range of motion and neck supple.        Feet:      Neurological: He is alert and oriented to person, place, and time. GCS score is 15. GCS eye subscore is 4. GCS verbal subscore is 5. GCS motor subscore is 6.   Skin: Skin is warm and dry. Capillary refill takes less than 2 seconds.   Psychiatric: He has a normal mood and affect. His behavior is normal. Thought content normal.         ED Course   Procedures  Labs Reviewed - No data to display       Imaging Results    None          Medications   ketorolac injection 30 mg (has no administration in time range)   oxyCODONE-acetaminophen 5-325 mg per tablet 1 tablet (has no administration in time range)     Medical Decision Making  Risk  Prescription drug management.                                      Clinical Impression:  Final diagnoses:  [G89.28] Chronic postoperative pain (Primary)          ED Disposition Condition    Discharge Stable          ED Prescriptions    None       Follow-up Information       Follow up With Specialties Details Why Contact Info    Belinda Welch MD General Surgery Go on 1/3/2024 for re-evaluation of today's complaint [address] [facility] [address] [place] [address]  [phone]               Amparo Rapp NP  12/31/23 5064

## 2024-01-07 ENCOUNTER — HOSPITAL ENCOUNTER (EMERGENCY)
Facility: HOSPITAL | Age: 54
Discharge: HOME | End: 2024-01-07
Attending: EMERGENCY MEDICINE
Payer: MEDICAID

## 2024-01-07 VITALS
SYSTOLIC BLOOD PRESSURE: 136 MMHG | TEMPERATURE: 99 F | HEIGHT: 64 IN | DIASTOLIC BLOOD PRESSURE: 76 MMHG | BODY MASS INDEX: 29.02 KG/M2 | WEIGHT: 170 LBS | OXYGEN SATURATION: 100 % | RESPIRATION RATE: 18 BRPM | HEART RATE: 78 BPM

## 2024-01-07 DIAGNOSIS — G89.29 CHRONIC FOOT PAIN, LEFT: Primary | ICD-10-CM

## 2024-01-07 DIAGNOSIS — M79.672 CHRONIC FOOT PAIN, LEFT: Primary | ICD-10-CM

## 2024-01-07 PROCEDURE — 96372 THER/PROPH/DIAG INJ SC/IM: CPT | Performed by: CLINICAL NURSE SPECIALIST

## 2024-01-07 PROCEDURE — 25000003 PHARM REV CODE 250: Performed by: CLINICAL NURSE SPECIALIST

## 2024-01-07 PROCEDURE — 63600175 PHARM REV CODE 636 W HCPCS: Performed by: CLINICAL NURSE SPECIALIST

## 2024-01-07 PROCEDURE — 99284 EMERGENCY DEPT VISIT MOD MDM: CPT

## 2024-01-07 RX ORDER — KETOROLAC TROMETHAMINE 30 MG/ML
30 INJECTION, SOLUTION INTRAMUSCULAR; INTRAVENOUS ONCE
Status: COMPLETED | OUTPATIENT
Start: 2024-01-07 | End: 2024-01-07

## 2024-01-07 RX ORDER — OXYCODONE AND ACETAMINOPHEN 10; 325 MG/1; MG/1
1 TABLET ORAL EVERY 4 HOURS PRN
Status: DISCONTINUED | OUTPATIENT
Start: 2024-01-07 | End: 2024-01-07 | Stop reason: HOSPADM

## 2024-01-07 RX ADMIN — KETOROLAC TROMETHAMINE 30 MG: 30 INJECTION, SOLUTION INTRAMUSCULAR; INTRAVENOUS at 01:01

## 2024-01-07 RX ADMIN — OXYCODONE HYDROCHLORIDE AND ACETAMINOPHEN 1 TABLET: 10; 325 TABLET ORAL at 01:01

## 2024-01-07 NOTE — PROGRESS NOTES
[facility]  Wound Care  History and Physical    Problem List Items Addressed This Visit          Cardiac/Vascular    Decreased pulses in feet    Overview     Pt with nonpalpable pulses BLE.  DEVYN on L 0.56 and R noncompressible in May 2023.  Now R sided DEVYN appreciated as DEVYN 0.3.  No vascular workup in past.  Needs xray to rule out osteomyelitis.  And CTA to eval vascular supply.         Relevant Orders    X-Ray Foot Complete 3 view Left    Sedimentation rate    C-REACTIVE PROTEIN    Comprehensive Metabolic Panel    HEMOGLOBIN A1C    CBC Auto Differential    CTA Runoff ABD PEL Bilat Lower Ext    PAD (peripheral artery disease)    Current Assessment & Plan     CTA needs to be ordered.         Relevant Orders    X-Ray Foot Complete 3 view Left    Sedimentation rate    C-REACTIVE PROTEIN    Comprehensive Metabolic Panel    HEMOGLOBIN A1C    CBC Auto Differential    CTA Runoff ABD PEL Bilat Lower Ext       Endocrine    Type 2 diabetes mellitus with skin complication, without long-term current use of insulin    Overview     Pt with longstanding diabetes.  Unsure of control.  Does not check blood sugars.  Last HgbA1c was 8.2 in July.  Needs repeat labs.  Ordered today         Relevant Orders    X-Ray Foot Complete 3 view Left    Sedimentation rate    C-REACTIVE PROTEIN    Comprehensive Metabolic Panel    HEMOGLOBIN A1C    CBC Auto Differential    CTA Runoff ABD PEL Bilat Lower Ext       Orthopedic    Ulcer of left foot, with fat layer exposed - Primary    Overview     Ischemic changes to L foot.  Medial foot reveals wound measuring 2x1.4x1.7cm and covered with necrosis.  Forefoot amputation site reveals wound measuring 2.5x3.7x0.5cm with necrosis.  Pt refuses debridement as it likely needs to be done in OR.  No erythema or drainage.  Dressed with mesalt.  Change daily.  Santyl ordered.  Dressing applied.  FU next week.         Relevant Orders    X-Ray Foot Complete 3 view Left    Sedimentation rate     C-REACTIVE PROTEIN    Comprehensive Metabolic Panel    HEMOGLOBIN A1C    CBC Auto Differential    CTA Runoff ABD PEL Bilat Lower Ext     Other Visit Diagnoses       Atherosclerosis of native artery of left lower extremity with gangrene                  History:    Past Medical History:   Diagnosis Date    Diabetes mellitus     Hypertension     Neuropathy        Past Surgical History:   Procedure Laterality Date    TOE AMPUTATION Left        History reviewed. No pertinent family history.     reports that he has never smoked. He has never used smokeless tobacco. No history on file for alcohol use and drug use.    has a current medication list which includes the following prescription(s): amlodipine, aspirin, atorvastatin, clonidine, santyl, furosemide, gabapentin, glipizide, hydrocodone-acetaminophen, ibuprofen, levofloxacin, magnesium, metformin, ondansetron, and oxycodone-acetaminophen.    Allergies:  Patient has no known allergies.    Review of Systems:  Review of Systems   Constitutional:  Positive for malaise/fatigue.   Musculoskeletal:  Positive for myalgias.   Neurological:  Positive for tingling and weakness.   All other systems reviewed and are negative.        Vitals:    12/06/23 1049   BP: (!) 197/110   Pulse: 87   Resp: 20   Temp: 98 °F (36.7 °C)         BMI:  There is no height or weight on file to calculate BMI.    Physical Exam:  Physical Exam  Vitals and nursing note reviewed.   Constitutional:       General: He is not in acute distress.     Appearance: Normal appearance.   HENT:      Head: Normocephalic and atraumatic.   Eyes:      Extraocular Movements: Extraocular movements intact.      Conjunctiva/sclera: Conjunctivae normal.      Pupils: Pupils are equal, round, and reactive to light.   Cardiovascular:      Rate and Rhythm: Normal rate and regular rhythm.      Heart sounds: Normal heart sounds.   Pulmonary:      Effort: Pulmonary effort is normal.      Breath sounds: Normal breath sounds.  "  Abdominal:      General: There is no distension.      Tenderness: There is no abdominal tenderness.   Skin:     Comments: See wound docs   Neurological:      General: No focal deficit present.      Mental Status: He is alert and oriented to person, place, and time. Mental status is at baseline.   Psychiatric:         Mood and Affect: Mood normal.         Behavior: Behavior normal.         Thought Content: Thought content normal.         Judgment: Judgment normal.         A1C:  No results for input(s): "HGBA1C" in the last 2160 hours.  BMP:  No results for input(s): "GLU", "NA", "K", "CL", "CO2", "BUN", "CREATININE", "CALCIUM", "MG" in the last 2160 hours.   CBC:  No results for input(s): "WBC", "RBC", "HGB", "HCT", "PLT", "MCV", "MCH", "MCHC" in the last 2160 hours.  CMP:  No results for input(s): "GLU", "CALCIUM", "ALBUMIN", "PROT", "NA", "K", "CO2", "CL", "BUN", "ALKPHOS", "ALT", "AST", "BILITOT" in the last 2160 hours.    Invalid input(s): "CREATININ"  PREALBUMIN:  No results for input(s): "PREALBUMIN" in the last 2160 hours.  WOUND CULTURES:  No results for input(s): "LABAERO" in the last 2160 hours.        Plan:  See Wound Docs note for plan and follow up.        Belinda Welch  [facility]  "

## 2024-01-07 NOTE — ED PROVIDER NOTES
Encounter Date: 1/7/2024       History     Chief Complaint   Patient presents with    Foot Pain     Complains of chronic left foot pain post toe amputation in September 2023.Next wound care appt is 01/10/24     53-year-old male presents emergency room with chronic left foot pain after toe amputation.  Patient is requesting a pain shot.  Patient ambulates with crutches.  Patient received wound care on January 10th        Review of patient's allergies indicates:  No Known Allergies  Past Medical History:   Diagnosis Date    Diabetes mellitus     Hypertension     Neuropathy      Past Surgical History:   Procedure Laterality Date    TOE AMPUTATION Left      No family history on file.  Social History     Tobacco Use    Smoking status: Never    Smokeless tobacco: Never     Review of Systems   Constitutional:  Negative for fever.   HENT:  Negative for sore throat.    Respiratory:  Negative for shortness of breath.    Cardiovascular:  Negative for chest pain.   Gastrointestinal:  Negative for nausea.   Genitourinary:  Negative for dysuria.   Musculoskeletal:  Positive for arthralgias, gait problem and myalgias. Negative for back pain.   Skin:  Negative for rash.   Neurological:  Negative for weakness.   Hematological:  Does not bruise/bleed easily.   All other systems reviewed and are negative.      Physical Exam     Initial Vitals [01/07/24 1330]   BP Pulse Resp Temp SpO2   136/76 78 16 98.5 °F (36.9 °C) 100 %      MAP       --         Physical Exam    Nursing note and vitals reviewed.  Constitutional: He appears well-developed and well-nourished.   HENT:   Head: Normocephalic and atraumatic.   Eyes: Pupils are equal, round, and reactive to light.   Abdominal: Abdomen is soft.   Musculoskeletal:         General: Normal range of motion.     Neurological: He is alert and oriented to person, place, and time.   Psychiatric: He has a normal mood and affect.         ED Course   Procedures  Labs Reviewed - No data to display        Imaging Results    None          Medications   oxyCODONE-acetaminophen  mg per tablet 1 tablet (has no administration in time range)   ketorolac injection 30 mg (has no administration in time range)     Medical Decision Making  Risk  Prescription drug management.                                      Clinical Impression:  Final diagnoses:  [M79.672, G89.29] Chronic foot pain, left (Primary)          ED Disposition Condition    Discharge Stable          ED Prescriptions    None       Follow-up Information    None          Ade Dykes NP  01/07/24 7660

## 2024-01-16 ENCOUNTER — HOSPITAL ENCOUNTER (EMERGENCY)
Facility: HOSPITAL | Age: 54
Discharge: HOME | End: 2024-01-16
Attending: EMERGENCY MEDICINE
Payer: MEDICAID

## 2024-01-16 VITALS
SYSTOLIC BLOOD PRESSURE: 191 MMHG | HEIGHT: 64 IN | WEIGHT: 170 LBS | DIASTOLIC BLOOD PRESSURE: 82 MMHG | BODY MASS INDEX: 29.02 KG/M2 | RESPIRATION RATE: 18 BRPM | OXYGEN SATURATION: 99 % | HEART RATE: 89 BPM | TEMPERATURE: 98 F

## 2024-01-16 DIAGNOSIS — G89.29 CHRONIC FOOT PAIN, LEFT: Primary | ICD-10-CM

## 2024-01-16 DIAGNOSIS — M79.672 CHRONIC FOOT PAIN, LEFT: Primary | ICD-10-CM

## 2024-01-16 PROCEDURE — 99284 EMERGENCY DEPT VISIT MOD MDM: CPT

## 2024-01-16 PROCEDURE — 96372 THER/PROPH/DIAG INJ SC/IM: CPT

## 2024-01-16 PROCEDURE — 63600175 PHARM REV CODE 636 W HCPCS

## 2024-01-16 RX ORDER — KETOROLAC TROMETHAMINE 30 MG/ML
30 INJECTION, SOLUTION INTRAMUSCULAR; INTRAVENOUS
Status: COMPLETED | OUTPATIENT
Start: 2024-01-16 | End: 2024-01-16

## 2024-01-16 RX ORDER — GABAPENTIN 300 MG/1
300 CAPSULE ORAL 3 TIMES DAILY
Qty: 42 CAPSULE | Refills: 0 | Status: SHIPPED | OUTPATIENT
Start: 2024-01-16 | End: 2024-01-30

## 2024-01-16 RX ADMIN — KETOROLAC TROMETHAMINE 30 MG: 30 INJECTION, SOLUTION INTRAMUSCULAR; INTRAVENOUS at 11:01

## 2024-01-16 NOTE — ED PROVIDER NOTES
Encounter Date: 1/16/2024       History     Chief Complaint   Patient presents with    Foot Pain     Pt reports having 2 toes amputated to left foot on 9/29.  States he is a diabetic and having slow healing. On pain meds but not working as well as the meds he got here previously.     53-year-old male with history of diabetes and hypertension presents to ED with complaints of left foot pain.  He had his 2nd and 5th toes amputated on September 29, 2023.  He reports increase in swelling and pain for the last 2 days.  He reports taking a leave without any improvement in symptoms.  He has been on his feet more often.  Pain is constant.  Pain does not radiate.  He does report that the drainage is clear and often malodorous.  He has not seen wound care.     The history is provided by the patient.     Review of patient's allergies indicates:  No Known Allergies  Past Medical History:   Diagnosis Date    Diabetes mellitus     Hypertension     Neuropathy      Past Surgical History:   Procedure Laterality Date    TOE AMPUTATION Left      No family history on file.  Social History     Tobacco Use    Smoking status: Never    Smokeless tobacco: Never     Review of Systems   Constitutional:  Negative for fever.   HENT:  Negative for sore throat.    Eyes: Negative.    Respiratory:  Negative for shortness of breath.    Cardiovascular:  Negative for chest pain.   Gastrointestinal:  Negative for nausea.   Endocrine: Negative.    Genitourinary:  Negative for dysuria.   Musculoskeletal:  Negative for back pain.   Skin:  Positive for wound. Negative for rash.   Allergic/Immunologic: Negative.    Neurological:  Negative for weakness.   Hematological:  Does not bruise/bleed easily.   Psychiatric/Behavioral: Negative.         Physical Exam     Initial Vitals   BP Pulse Resp Temp SpO2   01/16/24 1030 01/16/24 1028 01/16/24 1028 01/16/24 1028 01/16/24 1028   (!) 184/100 89 18 98.1 °F (36.7 °C) 99 %      MAP       --                Physical  Exam    Nursing note and vitals reviewed.  Constitutional: He appears well-developed and well-nourished.   HENT:   Head: Normocephalic and atraumatic.   Eyes: EOM are normal.   Neck: Neck supple.   Normal range of motion.  Cardiovascular:  Normal rate and regular rhythm.           Pulmonary/Chest: No respiratory distress.   Abdominal: He exhibits no distension.   Musculoskeletal:         General: Normal range of motion.      Cervical back: Normal range of motion and neck supple.     Neurological: He is alert and oriented to person, place, and time.   Skin: Skin is warm and dry.   Psychiatric: He has a normal mood and affect. Thought content normal.                       ED Course   Procedures  Labs Reviewed - No data to display       Imaging Results    None          Medications   ketorolac injection 30 mg (30 mg Intramuscular Given 1/16/24 1107)     Medical Decision Making  53-year-old male to ED for above complaints.  This is not a new problem.  He was not followed up with wound care for his issues.  Wounds to his feet appear better than previous pictures.  He was wound is malodorous however it appears it was always malodorous.  Patient reports he has been using peroxide and Betadine to clean his wounds, which I instructed him to stop.  He was instructed to clean wounds with plain soap and water.  Perform wound care as needed.  He was given Toradol for pain while in ED. will provide with refill for gabapentin for pain control.  He was strongly encouraged to follow up with wound care.  Return precautions given.                                      Clinical Impression:  Final diagnoses:  [M79.672, G89.29] Chronic foot pain, left (Primary)          ED Disposition Condition    Discharge Stable          ED Prescriptions       Medication Sig Dispense Start Date End Date Auth. Provider    gabapentin (NEURONTIN) 300 MG capsule Take 1 capsule (300 mg total) by mouth 3 (three) times daily. for 14 days 42 capsule 1/16/2024  1/30/2024 Goldy Gutiérrez NP          Follow-up Information       Follow up With Specialties Details Why Contact Info    Rosmery Delacruz, P Family Medicine   [address] [place] [id number]  [phone]      Belinda Welch MD General Surgery   [address] [place] [address] [address]  [facility] [address]  [phone]               Goldy Gutiérrez NP  01/16/24 0610

## 2024-03-19 ENCOUNTER — HOSPITAL ENCOUNTER (EMERGENCY)
Facility: HOSPITAL | Age: 54
Discharge: HOME | End: 2024-03-19
Attending: EMERGENCY MEDICINE
Payer: MEDICAID

## 2024-03-19 VITALS
BODY MASS INDEX: 28.34 KG/M2 | RESPIRATION RATE: 18 BRPM | TEMPERATURE: 99 F | SYSTOLIC BLOOD PRESSURE: 162 MMHG | OXYGEN SATURATION: 99 % | DIASTOLIC BLOOD PRESSURE: 72 MMHG | HEART RATE: 75 BPM | HEIGHT: 64 IN | WEIGHT: 166 LBS

## 2024-03-19 DIAGNOSIS — M79.672 LEFT FOOT PAIN: Primary | ICD-10-CM

## 2024-03-19 PROCEDURE — 96372 THER/PROPH/DIAG INJ SC/IM: CPT | Performed by: CLINICAL NURSE SPECIALIST

## 2024-03-19 PROCEDURE — 63600175 PHARM REV CODE 636 W HCPCS: Performed by: CLINICAL NURSE SPECIALIST

## 2024-03-19 PROCEDURE — 99284 EMERGENCY DEPT VISIT MOD MDM: CPT | Mod: 25

## 2024-03-19 RX ORDER — SULFAMETHOXAZOLE AND TRIMETHOPRIM 800; 160 MG/1; MG/1
1 TABLET ORAL 2 TIMES DAILY
Qty: 14 TABLET | Refills: 0 | Status: SHIPPED | OUTPATIENT
Start: 2024-03-19 | End: 2024-03-26

## 2024-03-19 RX ORDER — COLLAGENASE SANTYL 250 [ARB'U]/G
OINTMENT TOPICAL DAILY
Qty: 30 G | Refills: 2 | Status: SHIPPED | OUTPATIENT
Start: 2024-03-19

## 2024-03-19 RX ORDER — KETOROLAC TROMETHAMINE 30 MG/ML
60 INJECTION, SOLUTION INTRAMUSCULAR; INTRAVENOUS ONCE
Status: COMPLETED | OUTPATIENT
Start: 2024-03-19 | End: 2024-03-19

## 2024-03-19 RX ORDER — CEPHALEXIN 500 MG/1
500 CAPSULE ORAL 4 TIMES DAILY
Qty: 20 CAPSULE | Refills: 0 | Status: SHIPPED | OUTPATIENT
Start: 2024-03-19 | End: 2024-03-24

## 2024-03-19 RX ADMIN — KETOROLAC TROMETHAMINE 60 MG: 30 INJECTION, SOLUTION INTRAMUSCULAR; INTRAVENOUS at 08:03

## 2024-03-19 NOTE — ED PROVIDER NOTES
Encounter Date: 3/19/2024       History     Chief Complaint   Patient presents with    Foot Pain     Pt to the ER w/ complaints of chronic L foot pain after amputation of 2nd and 5th to in sept 2023. Pt seen in another ER in late bef, placed on multiple abx. Pt reports taking abx sporadically, requesting refill.     54-year-old male presents emergency room with chronic left foot pain requesting a pain shot and some antibiotics.  Patient has a history of amputation of a few toes on the left foot.  Left foot is wrapped and heel protector on patient        Review of patient's allergies indicates:  No Known Allergies  Past Medical History:   Diagnosis Date    Diabetes mellitus     Hypertension     Neuropathy      Past Surgical History:   Procedure Laterality Date    TOE AMPUTATION Left      No family history on file.  Social History     Tobacco Use    Smoking status: Never    Smokeless tobacco: Never     Review of Systems   Constitutional:  Negative for fever.   HENT:  Negative for sore throat.    Respiratory:  Negative for shortness of breath.    Cardiovascular:  Negative for chest pain.   Gastrointestinal:  Negative for nausea.   Genitourinary:  Negative for dysuria.   Musculoskeletal:  Positive for arthralgias, gait problem and myalgias. Negative for back pain.   Skin:  Negative for rash.   Neurological:  Negative for weakness.   Hematological:  Does not bruise/bleed easily.   All other systems reviewed and are negative.      Physical Exam     Initial Vitals [03/19/24 0812]   BP Pulse Resp Temp SpO2   (!) 162/72 75 18 98.8 °F (37.1 °C) 99 %      MAP       --         Physical Exam    Nursing note and vitals reviewed.  Constitutional: He appears well-developed and well-nourished.   HENT:   Head: Normocephalic and atraumatic.   Eyes: Pupils are equal, round, and reactive to light.   Neck:   Normal range of motion.  Musculoskeletal:         General: Normal range of motion.      Cervical back: Normal range of motion.      Neurological: He is alert and oriented to person, place, and time.   Psychiatric: He has a normal mood and affect.         ED Course   Procedures  Labs Reviewed - No data to display       Imaging Results    None          Medications   ketorolac injection 60 mg (60 mg Intramuscular Given 3/19/24 0853)     Medical Decision Making  Risk  Prescription drug management.                                      Clinical Impression:  Final diagnoses:  [M79.672] Left foot pain (Primary)          ED Disposition Condition    Discharge Stable          ED Prescriptions       Medication Sig Dispense Start Date End Date Auth. Provider    collagenase (SANTYL) ointment Apply topically once daily. 30 g 3/19/2024 -- Ade Dykes NP    cephALEXin (KEFLEX) 500 MG capsule Take 1 capsule (500 mg total) by mouth 4 (four) times daily. for 5 days 20 capsule 3/19/2024 3/24/2024 Ade Dykes NP    sulfamethoxazole-trimethoprim 800-160mg (BACTRIM DS) 800-160 mg Tab Take 1 tablet by mouth 2 (two) times daily. for 7 days 14 tablet 3/19/2024 3/26/2024 Ade Dykes NP          Follow-up Information       Follow up With Specialties Details Why Contact Info    Rosmery Delacruz, FNP Family Medicine  As needed [address] [place] [id number]  [phone]               Ade Dykes NP  03/19/24 5103

## 2024-04-17 ENCOUNTER — HOSPITAL ENCOUNTER (EMERGENCY)
Facility: HOSPITAL | Age: 54
Discharge: LEFT BEFORE DISCHARGE | End: 2024-04-17
Attending: EMERGENCY MEDICINE
Payer: MEDICAID

## 2024-04-17 VITALS
HEART RATE: 65 BPM | BODY MASS INDEX: 27.66 KG/M2 | HEIGHT: 64 IN | SYSTOLIC BLOOD PRESSURE: 136 MMHG | DIASTOLIC BLOOD PRESSURE: 43 MMHG | TEMPERATURE: 99 F | RESPIRATION RATE: 18 BRPM | OXYGEN SATURATION: 100 % | WEIGHT: 162 LBS

## 2024-04-17 DIAGNOSIS — M79.672 CHRONIC PAIN IN LEFT FOOT: ICD-10-CM

## 2024-04-17 DIAGNOSIS — F06.4 ANXIETY DISORDER DUE TO MEDICAL CONDITION: Primary | ICD-10-CM

## 2024-04-17 DIAGNOSIS — R11.10 VOMITING, UNSPECIFIED VOMITING TYPE, UNSPECIFIED WHETHER NAUSEA PRESENT: ICD-10-CM

## 2024-04-17 DIAGNOSIS — G89.29 CHRONIC PAIN IN LEFT FOOT: ICD-10-CM

## 2024-04-17 DIAGNOSIS — R05.9 COUGH, UNSPECIFIED TYPE: ICD-10-CM

## 2024-04-17 DIAGNOSIS — Z91.148 HISTORY OF MEDICATION NONCOMPLIANCE: ICD-10-CM

## 2024-04-17 PROCEDURE — 87502 INFLUENZA DNA AMP PROBE: CPT

## 2024-04-17 PROCEDURE — 25000003 PHARM REV CODE 250: Performed by: EMERGENCY MEDICINE

## 2024-04-17 PROCEDURE — 99283 EMERGENCY DEPT VISIT LOW MDM: CPT

## 2024-04-17 PROCEDURE — 87635 SARS-COV-2 COVID-19 AMP PRB: CPT | Performed by: EMERGENCY MEDICINE

## 2024-04-17 RX ORDER — ONDANSETRON 4 MG/1
8 TABLET, ORALLY DISINTEGRATING ORAL
Status: COMPLETED | OUTPATIENT
Start: 2024-04-17 | End: 2024-04-17

## 2024-04-17 RX ORDER — ONDANSETRON HYDROCHLORIDE 2 MG/ML
4 INJECTION, SOLUTION INTRAVENOUS
Status: DISCONTINUED | OUTPATIENT
Start: 2024-04-17 | End: 2024-04-17

## 2024-04-17 RX ADMIN — ONDANSETRON 8 MG: 4 TABLET, ORALLY DISINTEGRATING ORAL at 10:04

## 2024-04-17 NOTE — Clinical Note
"Date: 4/17/2024  Patient: Rikki Graham  Admitted: 4/17/2024  9:20 AM  Attending Provider: Taco Camacho,     Rikki Graham or his authorized caregiver has made the decision for the patient to leave the emergency department against  the advice of the emergency department staff. He or his authorized caregiver has been informed and understands the inherent risks, including death.  He or his authorized caregiver has decided to accept the responsibility for this decision. Rikki Graham and all necessary parties have been advised that he may return for further evaluation or treatment. His condition at time of discharge was stable.  Rikki Graham had current vital signs as follows:  BP (!) 136/43   Pulse 65   T emp 98.8 °F (37.1 °C) (Oral)   Resp 18   Ht 5' 4" (1.626 m)   Wt 73.5 kg (162 lb)   "

## 2024-04-17 NOTE — ED NOTES
Pt signed out AMA, after Dr Camacho explained all risks.  After Dr left the room, pt requests Hydrocodone for pain for his foot.  I explained that he needed to follow up with his foot surgeon or his PCP for management of his chronic pain.  Pt began arguing with me about other Drs giving him pain meds.  He asked to speak with Dr Camacho again.   made aware.

## 2024-04-17 NOTE — ED PROVIDER NOTES
Encounter Date: 4/17/2024       History     Chief Complaint   Patient presents with    Cough     Productive cough for 2 days, unable to sleep, also c/o vomiting with no appetite.  C/o pain to abdomen, head and left foot    Vomiting    Foot Problem     Had 2 toes amputated on Sept 29, reports needs antibiotics.     54-year-old male presents to the emergency department for evaluation due to cough and vomiting.  Onset 2 days prior to the evaluation.  No provocation.  No palliation.  No associated symptoms.  The patient also reports that he is concerned about infection in his left foot with a history of prior amputation.  No associated symptoms.        Review of patient's allergies indicates:  No Known Allergies  Past Medical History:   Diagnosis Date    Diabetes mellitus     Hypertension     Neuropathy      Past Surgical History:   Procedure Laterality Date    TOE AMPUTATION Left      No family history on file.  Social History     Tobacco Use    Smoking status: Never    Smokeless tobacco: Never     Review of Systems   Respiratory:  Positive for cough.    Gastrointestinal:  Positive for nausea and vomiting.   Musculoskeletal:         Left foot pain with previous amputation of 2 toes on the left foot   All other systems reviewed and are negative.      Physical Exam     Initial Vitals [04/17/24 0932]   BP Pulse Resp Temp SpO2   (!) 136/43 65 18 98.8 °F (37.1 °C) 100 %      MAP       --         Physical Exam    Nursing note and vitals reviewed.  Constitutional: He appears well-developed and well-nourished. No distress.   HENT:   Head: Normocephalic and atraumatic.   Eyes: EOM are normal. Pupils are equal, round, and reactive to light.   Neck:   Normal range of motion.  Cardiovascular:  Normal rate.           Pulmonary/Chest: Breath sounds normal. No respiratory distress. He has no wheezes. He has no rales.   Abdominal: Abdomen is soft. Bowel sounds are normal. He exhibits no distension. There is no abdominal tenderness.    Musculoskeletal:         General: Normal range of motion.      Cervical back: Normal range of motion.      Comments: Chronic amputation of the 2nd and 5th toe on the left foot.  No signs of infection.  No foot swelling.  No erythema.  No edema.  No fluctuance.  No discharge.  No bleeding.     Neurological: He is alert and oriented to person, place, and time. GCS score is 15.   Skin: Skin is warm and dry.   Psychiatric: He has a normal mood and affect. His mood appears not anxious.         ED Course   Procedures  Labs Reviewed   SARS-COV-2 RDRP GENE   POCT INFLUENZA A/B MOLECULAR          Imaging Results    None          Medications   ondansetron disintegrating tablet 8 mg (8 mg Oral Given 4/17/24 1005)     Medical Decision Making  Amount and/or Complexity of Data Reviewed  Labs: ordered.               ED Course as of 04/17/24 1035   Wed Apr 17, 2024   1015 The patient is leaving against medical advice.  The patient refused all lab work as well as bedside blood glucose.  The patient is a noncompliant diabetic and with his chief complaint of vomiting there was significant concern for diabetic ketoacidosis as a cause of the patient's vomiting.  The severity of this condition was discussed with the patient.  All risks benefits and alternatives were discussed with the patient.  The patient verbalized understanding of discussion.  All questions answered.  The patient possesses decision-making capacity.  The patient is awake, alert, and oriented to person, place, time, and situation.  No focal deficits.  Cranial nerves 2-12 are grossly intact bilaterally.  GCS 15.  Vital signs stable.  DISCHARGE AGAINST MEDICAL ADVICE.     [DH]   1018 Just prior to discharge, the patient requested a prescription for pain medication for his chronic foot pain .  I, Dr. Camacho explained to the patient that he will have to follow up with his primary care provider or surgeon for pain medications. [DH]      ED Course User Index  [DH] Doug  Taco MANZO DO                           Clinical Impression:  Final diagnoses:  [M79.672, G89.29] Chronic pain in left foot  [R11.10] Vomiting, unspecified vomiting type, unspecified whether nausea present  [R05.9] Cough, unspecified type  [Z91.148] History of medication noncompliance  [F06.4] Anxiety disorder due to medical condition (Primary)          ED Disposition Condition    AMTaco Shine DO  04/17/24 1035

## 2024-04-17 NOTE — LETTER
Patient: Rikki Graham  YOB: 1970  Date: 4/17/2024 Time: 10:01 AM  Location: [facility] Emergency Department    [facility]    Chart #:[id number]    This will certify that I, the undersigned,    ______________________________________________________________________    A patient in the above named [facility] center, having requested discharge and removal from the medical [place] against the advice of my attending physician(s), hereby release [facility], its physicians, officers and employees, severally and individually, from any and all liability of any nature whatsoever for any injury or harm or complication of any kind that may result directly or indirectly, by reason of my terminating my stay as a patient at [facility] Department and my departure from [facility], and hereby waive any and all rights of action I may now have or later acquire as a result of my voluntary departure from [facility] and the termination of my stay as a patient therein.    This release is made with the full knowledge of the danger that may result from the action which I am taking.      Date:_______________________                         ___________________________                                                                                    Patient/Legal Representative    Witness:        ____________________________                          ___________________________  Nurse                                                                        Physician

## 2024-04-17 NOTE — ED NOTES
In pt room to start IV and draw blood.  Pt refused all blood work, including POCT glucose, and IV fluids.  Pt reports that he is Zoroastrianism and does not give blood.  Dr Camacho made aware.  Orders discontinued.

## 2024-04-18 ENCOUNTER — HOSPITAL ENCOUNTER (EMERGENCY)
Facility: HOSPITAL | Age: 54
Discharge: LEFT BEFORE DISCHARGE | End: 2024-04-18
Attending: EMERGENCY MEDICINE
Payer: MEDICAID

## 2024-04-18 VITALS
TEMPERATURE: 98 F | SYSTOLIC BLOOD PRESSURE: 104 MMHG | WEIGHT: 162 LBS | DIASTOLIC BLOOD PRESSURE: 61 MMHG | OXYGEN SATURATION: 97 % | BODY MASS INDEX: 27.66 KG/M2 | RESPIRATION RATE: 17 BRPM | HEART RATE: 54 BPM | HEIGHT: 64 IN

## 2024-04-18 DIAGNOSIS — E13.69 OTHER SPECIFIED DIABETES MELLITUS WITH OTHER SPECIFIED COMPLICATION, UNSPECIFIED WHETHER LONG TERM INSULIN USE: ICD-10-CM

## 2024-04-18 DIAGNOSIS — R73.9 HYPERGLYCEMIA: ICD-10-CM

## 2024-04-18 DIAGNOSIS — Z91.148 NONCOMPLIANCE WITH MEDICATION REGIMEN: ICD-10-CM

## 2024-04-18 DIAGNOSIS — R11.2 NAUSEA AND VOMITING, UNSPECIFIED VOMITING TYPE: ICD-10-CM

## 2024-04-18 DIAGNOSIS — I21.3 ACUTE ST ELEVATION MYOCARDIAL INFARCTION (STEMI), UNSPECIFIED ARTERY: Primary | ICD-10-CM

## 2024-04-18 DIAGNOSIS — R07.9 CHEST PAIN: ICD-10-CM

## 2024-04-18 DIAGNOSIS — Z53.29 REFUSAL OF CARE BY PATIENT: ICD-10-CM

## 2024-04-18 DIAGNOSIS — R00.1 BRADYCARDIA: ICD-10-CM

## 2024-04-18 DIAGNOSIS — R11.10 VOMITING: ICD-10-CM

## 2024-04-18 LAB
OHS QRS DURATION: 90 MS
OHS QRS DURATION: 94 MS
OHS QTC CALCULATION: 434 MS
OHS QTC CALCULATION: 449 MS
POCT GLUCOSE: 243 MG/DL (ref 70–110)

## 2024-04-18 PROCEDURE — 99285 EMERGENCY DEPT VISIT HI MDM: CPT | Mod: 25

## 2024-04-18 PROCEDURE — 93005 ELECTROCARDIOGRAM TRACING: CPT

## 2024-04-18 PROCEDURE — 63600175 PHARM REV CODE 636 W HCPCS: Performed by: EMERGENCY MEDICINE

## 2024-04-18 PROCEDURE — 93010 ELECTROCARDIOGRAM REPORT: CPT | Mod: ,,, | Performed by: INTERNAL MEDICINE

## 2024-04-18 PROCEDURE — 82962 GLUCOSE BLOOD TEST: CPT

## 2024-04-18 RX ORDER — HEPARIN SODIUM,PORCINE/D5W 25000/250
12 INTRAVENOUS SOLUTION INTRAVENOUS CONTINUOUS
Status: DISCONTINUED | OUTPATIENT
Start: 2024-04-18 | End: 2024-04-18 | Stop reason: HOSPADM

## 2024-04-18 RX ORDER — ONDANSETRON HYDROCHLORIDE 2 MG/ML
4 INJECTION, SOLUTION INTRAVENOUS
Status: COMPLETED | OUTPATIENT
Start: 2024-04-18 | End: 2024-04-18

## 2024-04-18 RX ORDER — ASPIRIN 325 MG
325 TABLET ORAL
Status: DISCONTINUED | OUTPATIENT
Start: 2024-04-18 | End: 2024-04-18 | Stop reason: HOSPADM

## 2024-04-18 RX ADMIN — SODIUM CHLORIDE, POTASSIUM CHLORIDE, SODIUM LACTATE AND CALCIUM CHLORIDE 1000 ML: 600; 310; 30; 20 INJECTION, SOLUTION INTRAVENOUS at 07:04

## 2024-04-18 RX ADMIN — ONDANSETRON HYDROCHLORIDE 4 MG: 2 SOLUTION INTRAMUSCULAR; INTRAVENOUS at 07:04

## 2024-04-18 NOTE — ED NOTES
"EKG brought to MD via RN. MD stated that he wanted a repeat EKG done with the portable EKG machine. Portable EKG machine brought into room. Patient agreed to have repeat EKG. Patient informed via MD the results of the EKG. Patient refused to have blood work done. Patient stated that he only wanted "a boost" from the fluids and that he did not wish to be treated or transferred to another facility. MD asked that patient contact a family member for him to speak to.     ED Director at bedside per MD request. Patient adamantly refusing treatment despite numerous attempts.    "

## 2024-04-18 NOTE — ED NOTES
Patient refused to sign AMA form. Patient stated that he signed the form every other time but he would not sign it due to the way that the MD treated him. AMA signed by RN and witnessed by several witnesses.

## 2024-04-18 NOTE — ED NOTES
AYSHA Jain entered the room and stated that the MD wanted the medication pulled and brought into the room for the patient to physically refuse to take medicine. Heparin not approved at this time. Nurse brought Aspirin to patient. Patient refused to take Aspirin in the hallway near room 6. Ned,  ED Director present during this time.

## 2024-04-18 NOTE — ED NOTES
I engaged in a conversation with Mr. Rikki Graham to ensure he fully understood his condition and the implications of his decision. I informed him of Dr. Manriquez' diagnosis of a heart attack, specifically a blood clot obstructing a vessel in his heart, emphasizing the life-threatening nature of his condition. I explained that without prompt medical intervention, the risk of death is significantly increased. Patient acknowledged his understanding of the situation but remained steadfast in his decision to refuse further treatment.    Throughout our conversation, patient demonstrated full cognitive capacity, appearing awake, alert, and oriented to person, place, time, and situation. Despite his refusal of treatment, he did not express any confusion or disorientation.    I made multiple attempts to persuade patient to reconsider his decision and remain for treatment. I reiterated the seriousness of his condition and the potential consequences of leaving without adequate medical care. Despite these efforts, patient remained resolute in his decision to refuse further treatment.    Patient refused to sign the AMA form, indicating his decision to leave the facility without further treatment. Patient refused to allow us to speak with his spouse about his condition. Advised patient to continue to monitor his condition closely and return to the ER should he reconsider his decision or experience any deterioration in his health status. Patient verbalized understanding.

## 2024-04-18 NOTE — ED NOTES
Urine specimen requested.  Instructed on proper procedure for obtaining a clean catch urine specimen.

## 2024-04-18 NOTE — ED NOTES
Dr. Manriquez contacted me to discuss the care of Mr. Rikki Graham, who presented with symptoms concerning for ST-elevation myocardial infarction. Dr. Manriquez expressed concerns regarding Mr. Graham's condition and the need for immediate medical intervention. Despite knowledge of the risks associated with leaving against medical advice and the potential benefits of staying for treatment, Mr. Graham indicated his intention to leave AMA.

## 2024-04-18 NOTE — ED PROVIDER NOTES
Encounter Date: 4/18/2024       History     Chief Complaint   Patient presents with    Emesis     Vomiting x 2 days. Patient reports he came to this ED yesterday and signed out AMA. He would like to continue his treatment plan now.      Here yesterday and left AMA, now wishes to proceed with evaluation.  Data reviewed.  Diabetic, unclear if he is taking his medicines, recent distal lateral left foot partial amputation being followed by Podiatry, healing well but slowly.  Reports he has vomiting for 2 days with various other complaints including decreased energy, mild sense of dyspnea, vague sense of chills and chest pain, constipation until this morning when he was able to have a normal bowel movement, feeling generally cold.  No documented fever.  No abdominal pain or urinary complaints.  Vital signs unremarkable yesterday and today.  No other complaints.  COVID and flu testing yesterday negative.    The history is provided by the patient. No  was used.     Review of patient's allergies indicates:  No Known Allergies  Past Medical History:   Diagnosis Date    Diabetes mellitus     Hypertension     Neuropathy      Past Surgical History:   Procedure Laterality Date    TOE AMPUTATION Left      No family history on file.  Social History     Tobacco Use    Smoking status: Never    Smokeless tobacco: Never     Review of Systems   Constitutional:  Positive for chills and fatigue. Negative for fever.   HENT:  Negative for congestion, facial swelling, nosebleeds and sinus pressure.    Eyes:  Negative for pain and redness.   Respiratory:  Positive for shortness of breath. Negative for chest tightness and wheezing.    Cardiovascular:  Positive for chest pain. Negative for palpitations and leg swelling.   Gastrointestinal:  Positive for constipation, nausea and vomiting. Negative for abdominal distention, abdominal pain and diarrhea.   Endocrine: Negative for cold intolerance, polydipsia and polyphagia.    Genitourinary:  Negative for difficulty urinating, dysuria, frequency and hematuria.   Musculoskeletal:  Negative for arthralgias, back pain, myalgias and neck pain.   Skin:  Positive for wound. Negative for color change and rash.   Neurological:  Positive for weakness. Negative for dizziness, numbness and headaches.   Hematological:  Negative for adenopathy. Does not bruise/bleed easily.   Psychiatric/Behavioral:  Negative for agitation and behavioral problems.    All other systems reviewed and are negative.      Physical Exam     Initial Vitals [04/18/24 0716]   BP Pulse Resp Temp SpO2   104/61 (!) 54 17 98.1 °F (36.7 °C) 97 %      MAP       --         Physical Exam    Nursing note and vitals reviewed.  Constitutional: He appears well-developed and well-nourished. He is not diaphoretic. No distress.   HENT:   Head: Normocephalic and atraumatic.   Mouth/Throat: Oropharynx is clear and moist. No oropharyngeal exudate.   Eyes: Conjunctivae and EOM are normal. Pupils are equal, round, and reactive to light. Right eye exhibits no discharge. Left eye exhibits no discharge. No scleral icterus.   Neck: Neck supple. No thyromegaly present. No tracheal deviation present. No JVD present.   Normal range of motion.  Cardiovascular:  Normal rate, regular rhythm and normal heart sounds.     Exam reveals no gallop and no friction rub.       No murmur heard.  Pulmonary/Chest: Breath sounds normal. No respiratory distress. He has no wheezes. He has no rhonchi. He has no rales. He exhibits no tenderness.   Abdominal: Abdomen is soft. Bowel sounds are normal. He exhibits no distension and no mass. There is no abdominal tenderness. There is no rebound and no guarding.   Musculoskeletal:         General: No tenderness or edema. Normal range of motion.      Cervical back: Normal range of motion and neck supple.      Comments: Healing amputation site, left distal lateral foot, healing well without sign of infection.      Lymphadenopathy:     He has no cervical adenopathy.   Neurological: He is alert and oriented to person, place, and time. He has normal strength. No cranial nerve deficit.   Skin: Skin is warm and dry. No rash noted. No erythema.   Psychiatric: He has a normal mood and affect. His behavior is normal. Judgment and thought content normal.         ED Course   Critical Care    Date/Time: 4/18/2024 8:09 AM    Performed by: Armin Manriquez MD  Authorized by: Armin Manriquez MD  Direct patient critical care time: 15 minutes  Additional history critical care time: 10 minutes  Ordering / reviewing critical care time: 10 minutes  Documentation critical care time: 15 minutes  Total critical care time (exclusive of procedural time) : 50 minutes  Critical care time was exclusive of separately billable procedures and treating other patients and teaching time.  Critical care was necessary to treat or prevent imminent or life-threatening deterioration of the following conditions: cardiac failure.  Critical care was time spent personally by me on the following activities: development of treatment plan with patient or surrogate, evaluation of patient's response to treatment, examination of patient, obtaining history from patient or surrogate, ordering and performing treatments and interventions, ordering and review of laboratory studies, ordering and review of radiographic studies, pulse oximetry, re-evaluation of patient's condition and review of old charts.        Labs Reviewed   POCT GLUCOSE - Abnormal; Notable for the following components:       Result Value    POCT Glucose 243 (*)     All other components within normal limits   URINALYSIS, REFLEX TO URINE CULTURE   DRUG SCREEN PANEL, URINE EMERGENCY   POCT GLUCOSE MONITORING CONTINUOUS     EKG Readings: (Independently Interpreted)   Initial Reading: STEMI. Rhythm: Normal Sinus Rhythm.   Normal sinus rhythm with PACs, blocked PAC's, left ventricular hypertrophy with  repolarization abnormality, likely old anterior infarction.  Significant inferior ST elevations with reciprocal change, acute ST-elevation myocardial infarction. There is no previous EKG available for comparison.       Imaging Results              X-Ray Chest 1 View (In process)  Result time 04/18/24 07:52:26   Procedure changed from X-Ray Chest PA And Lateral                    Medications   lactated ringers bolus 1,000 mL (1,000 mLs Intravenous New Bag 4/18/24 0741)   aspirin tablet 325 mg (has no administration in time range)   heparin 25,000 units in dextrose 5% (100 units/ml) IV bolus from bag LOW INTENSITY nomogram - [facility] (has no administration in time range)   heparin 25,000 units in dextrose 5% 250 mL (100 units/mL) infusion LOW INTENSITY nomogram - [facility] (has no administration in time range)   heparin 25,000 units in dextrose 5% (100 units/ml) IV bolus from bag LOW INTENSITY nomogram - [facility] (has no administration in time range)   heparin 25,000 units in dextrose 5% (100 units/ml) IV bolus from bag LOW INTENSITY nomogram - [facility] (has no administration in time range)   ondansetron injection 4 mg (4 mg Intravenous Given 4/18/24 0740)     8:07 AM Despite initially asking for evaluation treatment and describing that he did want to proceed with evaluation and treatment as was recommended yesterday, he has subsequently change his mind in his refusing all interventions accept IV fluids and Accu-Chek.  Repeat EKGs show inferior ST-elevation MI with reciprocal change, the patient is competent and fully informed that he is in the process of having a heart attack but he is continuing to refuse basic intervention, transfer, laboratory studies, medications, etc..  I am attempting to contact family to try to get him to allow standard therapy but at this point he is refusing and is judged mentally competent.  I have bag completed with him to allow us to do our basic job and so far he is still refusing.      8:11 AM I have  engaged the nursing administration structure to try to assist in convincing him to allow us to provide potentially life-saving medical care.    8:13 AM He has apparently removed monitors, etc. and left physically.  We are trying to intervene by talking with his wife if at all possible.    8:20 AM he left ambulatory, competent, against medical advice, fully aware of circumstances, fully informed of the gravity of his decision and potentially lethal consequences of his choices.  He refused to let us speak to his wife or anyone else.      Medical Decision Making  Problems Addressed:  Acute ST elevation myocardial infarction (STEMI), unspecified artery: acute illness or injury    Amount and/or Complexity of Data Reviewed  Labs: ordered. Decision-making details documented in ED Course.  Radiology: ordered. Decision-making details documented in ED Course.  ECG/medicine tests: ordered and independent interpretation performed. Decision-making details documented in ED Course.    Risk  OTC drugs.  Prescription drug management.  Decision regarding hospitalization.      Additional MDM:   Differential Diagnosis:   STEMI, electrolyte imbalance, multiple other undiagnosed conditions, evaluation hindered by refusal of care.                                    Clinical Impression:  Final diagnoses:  [R07.9] Chest pain  [R11.2] Nausea and vomiting, unspecified vomiting type  [I21.3] Acute ST elevation myocardial infarction (STEMI), unspecified artery (Primary)  [Z91.148] Noncompliance with medication regimen  [Z53.29] Refusal of care by patient  [E13.69] Other specified diabetes mellitus with other specified complication, unspecified whether long term insulin use  [R73.9] Hyperglycemia  [R00.1] Bradycardia          ED Disposition Condition    AMA Stable                Armin Manriquez MD  04/18/24 1338

## 2024-04-18 NOTE — LETTER
Patient: Rikki Graham  YOB: 1970  Date: 4/18/2024 Time: 8:08 AM  Location: [facility] Emergency Department    Leaving the [facility] Against Medical Advice    Chart #:[id number]    This will certify that I, the undersigned,    ______________________________________________________________________    A patient in the above named [facility] center, having requested discharge and removal from the medical [place] against the advice of my attending physician(s), hereby release [facility], its physicians, officers and employees, severally and individually, from any and all liability of any nature whatsoever for any injury or harm or complication of any kind that may result directly or indirectly, by reason of my terminating my stay as a patient at [facility] Department and my departure from [facility], and hereby waive any and all rights of action I may now have or later acquire as a result of my voluntary departure from [facility] and the termination of my stay as a patient therein.    This release is made with the full knowledge of the danger that may result from the action which I am taking.      Date:_______________________                         ___________________________                                                                                    Patient/Legal Representative    Witness:        ____________________________                          ___________________________  Nurse                                                                        Physician